# Patient Record
Sex: MALE | Race: WHITE | NOT HISPANIC OR LATINO | Employment: OTHER | URBAN - METROPOLITAN AREA
[De-identification: names, ages, dates, MRNs, and addresses within clinical notes are randomized per-mention and may not be internally consistent; named-entity substitution may affect disease eponyms.]

---

## 2017-02-13 DIAGNOSIS — L40.50 PSORIATIC ARTHRITIS (H): ICD-10-CM

## 2017-02-13 NOTE — TELEPHONE ENCOUNTER
Was to return in 6 weeks to follow up starting MTX.  Left message on answering machine for patient to call back.  Needs to schedule the follow up appt.    Usha Nunez RN  [facility]

## 2017-02-17 NOTE — TELEPHONE ENCOUNTER
Patient made appt 2/28.    Please provide 1 refill to cover until appt.    Usha Nunez RN  [facility]

## 2017-02-28 ENCOUNTER — OFFICE VISIT (OUTPATIENT)
Dept: RHEUMATOLOGY | Facility: CLINIC | Age: 37
End: 2017-02-28
Payer: COMMERCIAL

## 2017-02-28 VITALS
BODY MASS INDEX: 34.56 KG/M2 | SYSTOLIC BLOOD PRESSURE: 143 MMHG | WEIGHT: 234 LBS | HEART RATE: 84 BPM | DIASTOLIC BLOOD PRESSURE: 78 MMHG

## 2017-02-28 DIAGNOSIS — L40.50 PSORIATIC ARTHRITIS (H): ICD-10-CM

## 2017-02-28 PROCEDURE — 99213 OFFICE O/P EST LOW 20 MIN: CPT | Performed by: INTERNAL MEDICINE

## 2017-02-28 NOTE — PROGRESS NOTES
Leon Lind is seen back in followup for his presumed psoriatic arthritis.  He is now on methotrexate and thinks he is doing reasonably well.  He denies having any side effects from the methotrexate, nausea, vomiting, diarrhea, stomatitis.  He is not on prednisone.  He says he definitely feels better and the methotrexate, but is still stiff and still having trouble throwing a ball with his left arm because of the stiffness and discomfort in the shoulders and basically the hips.  He also does not think there has been much change in the rash that is probably psoriasis.  He felt great on the prednisone, just feels better on the methotrexate.      PHYSICAL EXAMINATION:  Blood pressure 143/78, pulse 84.  Joint exam, he definitely does appear stiff and slow reaching for things above his head and getting out of a chair.  There is no obvious synovitis at the MCPs or PIPs.  There is somewhat nonspecific rash, could be psoriasis, near his elbows.      IMPRESSION:  Psoriatic arthritis.      RECOMMENDATIONS:   1.  Increase methotrexate to 20 mg weekly.   2.  Start folic acid.   3.  Check methotrexate labs.   4.  Defer prednisone usage for now, but he will let me know if he changes his mind.   5.  Follow up in approximately 6 weeks.         EMELIA AMADOR MD             D: 2017 12:31   T: 2017 12:52   MT: KAYLEIGH#[id number]      Name:     LEON LIND   MRN:      [id number]        Account:      [id number]   :      1980           Visit Date:   2017      Document: [id number]

## 2017-02-28 NOTE — NURSING NOTE
"Chief Complaint   Patient presents with     RECHECK   states feeling the same, barely using left arm.  prednisone took the pain away.        Initial /78  Pulse 84  Wt 106.1 kg (234 lb)  BMI 34.56 kg/m2 Estimated body mass index is 34.56 kg/(m^2) as calculated from the following:    Height as of 9/30/16: 1.753 m (5' 9\").    Weight as of this encounter: 106.1 kg (234 lb).      Patient prefers to be contacted via at Home.   Okay to leave detailed message: Yes  Patient uses MyChart: No    Samara WATT LPN    "

## 2017-02-28 NOTE — MR AVS SNAPSHOT
"              After Visit Summary   2/28/2017    Leon Samuels    MRN: [id number]           Patient Information     Date Of Birth          1980        Visit Information        Provider Department      2/28/2017 7:30 AM Colt Moncada MD [facility]        Today's Diagnoses     Psoriatic arthritis (H)           Follow-ups after your visit        Follow-up notes from your care team     Return in about 6 weeks (around 4/11/2017).      Future tests that were ordered for you today     Open Future Orders        Priority Expected Expires Ordered    CBC with platelets differential Routine  2/28/2018 2/28/2017    Comprehensive metabolic panel Routine  2/28/2018 2/28/2017            Who to contact     If you have questions or need follow up information about today's clinic visit or your schedule please contact [facility] directly at [phone].  Normal or non-critical lab and imaging results will be communicated to you by MyChart, letter or phone within 4 business days after the clinic has received the results. If you do not hear from us within 7 days, please contact the clinic through [email or web address] or phone. If you have a critical or abnormal lab result, we will notify you by phone as soon as possible.  Submit refill requests through [employer] or call your pharmacy and they will forward the refill request to us. Please allow 3 business days for your refill to be completed.          Additional Information About Your Visit        MyChart Information     Flock lets you send messages to your doctor, view your test results, renew your prescriptions, schedule appointments and more. To sign up, go to [email or web address] . Click on \"Log in\" on the left side of the screen, which will take you to the Welcome page. Then click on \"Sign up Now\" on the right side of the page.     You will be asked to enter the access code listed below, as well as some personal information. " Please follow the directions to create your username and password.     Your access code is: [email or web address]  Expires: 2017  8:27 AM     Your access code will  in 90 days. If you need help or a new code, please call your [place] clinic or [phone].        Care EveryWhere ID     This is your Care EveryWhere ID. This could be used by other organizations to access your [place] medical records  [id number]        Your Vitals Were     Pulse BMI (Body Mass Index)                84 34.56 kg/m2           Blood Pressure from Last 3 Encounters:   17 143/78   16 123/78   16 130/80    Weight from Last 3 Encounters:   17 106.1 kg (234 lb)   16 101.2 kg (223 lb)   16 104.8 kg (231 lb)                 Today's Medication Changes          These changes are accurate as of: 17  8:27 AM.  If you have any questions, ask your nurse or doctor.               These medicines have changed or have updated prescriptions.        Dose/Directions    methotrexate 2.5 MG tablet CHEMO   This may have changed:  how much to take   Used for:  Psoriatic arthritis (H)   Changed by:  Colt Moncada MD        Dose:  20 mg   Take 8 tablets (20 mg) by mouth once a week   Quantity:  40 tablet   Refills:  1            Where to get your medicines      These medications were sent to [place] [address] [place] [address] [address]    Hours:  Tech issues with their phone system Phone:  [phone]     methotrexate 2.5 MG tablet CHEMO                Primary Care Provider Office Phone # Fax #    Lilliana Bailey [phone]       [facility] [address] [place] [address]        Thank you!     Thank you for choosing [facility]  for your care. Our goal is always to provide you with excellent care. Hearing back from our patients is one way we can continue to improve our services. Please take  a few minutes to complete the written survey that you may receive in the mail after your visit with us. Thank you!             Your Updated Medication List - Protect others around you: Learn how to safely use, store and throw away your medicines at www.disposemymeds.org.          This list is accurate as of: 2/28/17  8:27 AM.  Always use your most recent med list.                   Brand Name Dispense Instructions for use    albuterol 108 (90 BASE) MCG/ACT Inhaler    PROAIR HFA/PROVENTIL HFA/VENTOLIN HFA     Inhale 2 puffs into the lungs every 6 hours       folic acid 1 MG tablet    FOLVITE    100 tablet    Take 1 tablet (1 mg) by mouth daily       methotrexate 2.5 MG tablet CHEMO     40 tablet    Take 8 tablets (20 mg) by mouth once a week       predniSONE 5 MG tablet    DELTASONE    70 tablet    20 mg for 1 week--stop if not better; otherwise, taper by 5 mg weekly       QVAR IN      Inhale 2 puffs into the lungs 2 times daily

## 2017-04-07 ENCOUNTER — APPOINTMENT (OUTPATIENT)
Dept: GENERAL RADIOLOGY | Facility: CLINIC | Age: 37
End: 2017-04-07
Attending: EMERGENCY MEDICINE
Payer: COMMERCIAL

## 2017-04-07 ENCOUNTER — HOSPITAL ENCOUNTER (EMERGENCY)
Facility: CLINIC | Age: 37
Discharge: HOME | End: 2017-04-07
Attending: EMERGENCY MEDICINE | Admitting: EMERGENCY MEDICINE
Payer: COMMERCIAL

## 2017-04-07 VITALS
WEIGHT: 230 LBS | RESPIRATION RATE: 16 BRPM | OXYGEN SATURATION: 96 % | TEMPERATURE: 101.3 F | HEART RATE: 99 BPM | SYSTOLIC BLOOD PRESSURE: 120 MMHG | BODY MASS INDEX: 33.97 KG/M2 | DIASTOLIC BLOOD PRESSURE: 48 MMHG

## 2017-04-07 DIAGNOSIS — J45.901 ASTHMA EXACERBATION: ICD-10-CM

## 2017-04-07 DIAGNOSIS — L40.50 PSORIATIC ARTHRITIS (H): ICD-10-CM

## 2017-04-07 DIAGNOSIS — J10.1 INFLUENZA B: ICD-10-CM

## 2017-04-07 LAB
ALBUMIN SERPL-MCNC: 4.1 G/DL (ref 3.4–5)
ALP SERPL-CCNC: 96 U/L (ref 40–150)
ALT SERPL W P-5'-P-CCNC: 69 U/L (ref 0–70)
ANION GAP SERPL CALCULATED.3IONS-SCNC: 9 MMOL/L (ref 3–14)
AST SERPL W P-5'-P-CCNC: 38 U/L (ref 0–45)
BASOPHILS # BLD AUTO: 0 10E9/L (ref 0–0.2)
BASOPHILS NFR BLD AUTO: 0.4 %
BILIRUB DIRECT SERPL-MCNC: 0.2 MG/DL (ref 0–0.2)
BILIRUB SERPL-MCNC: 1.1 MG/DL (ref 0.2–1.3)
BUN SERPL-MCNC: 12 MG/DL (ref 7–30)
CALCIUM SERPL-MCNC: 9 MG/DL (ref 8.5–10.1)
CHLORIDE SERPL-SCNC: 102 MMOL/L (ref 94–109)
CO2 BLDCOV-SCNC: 22 MMOL/L (ref 21–28)
CO2 SERPL-SCNC: 25 MMOL/L (ref 20–32)
CREAT SERPL-MCNC: 1.3 MG/DL (ref 0.66–1.25)
DIFFERENTIAL METHOD BLD: NORMAL
EOSINOPHIL # BLD AUTO: 0 10E9/L (ref 0–0.7)
EOSINOPHIL NFR BLD AUTO: 0.1 %
ERYTHROCYTE [DISTWIDTH] IN BLOOD BY AUTOMATED COUNT: 13 % (ref 10–15)
FLUAV+FLUBV AG SPEC QL: ABNORMAL
FLUAV+FLUBV AG SPEC QL: NEGATIVE
GFR SERPL CREATININE-BSD FRML MDRD: 62 ML/MIN/1.7M2
GLUCOSE SERPL-MCNC: 112 MG/DL (ref 70–99)
HCT VFR BLD AUTO: 45.8 % (ref 40–53)
HGB BLD-MCNC: 16.1 G/DL (ref 13.3–17.7)
IMM GRANULOCYTES # BLD: 0 10E9/L (ref 0–0.4)
IMM GRANULOCYTES NFR BLD: 0.3 %
LACTATE BLD-SCNC: 1.4 MMOL/L (ref 0.7–2.1)
LYMPHOCYTES # BLD AUTO: 0.8 10E9/L (ref 0.8–5.3)
LYMPHOCYTES NFR BLD AUTO: 10.6 %
MCH RBC QN AUTO: 31.7 PG (ref 26.5–33)
MCHC RBC AUTO-ENTMCNC: 35.2 G/DL (ref 31.5–36.5)
MCV RBC AUTO: 90 FL (ref 78–100)
MONOCYTES # BLD AUTO: 0.3 10E9/L (ref 0–1.3)
MONOCYTES NFR BLD AUTO: 4.6 %
NEUTROPHILS # BLD AUTO: 6.2 10E9/L (ref 1.6–8.3)
NEUTROPHILS NFR BLD AUTO: 84 %
NRBC # BLD AUTO: 0 10*3/UL
NRBC BLD AUTO-RTO: 0 /100
PCO2 BLDV: 33 MM HG (ref 40–50)
PH BLDV: 7.43 PH (ref 7.32–7.43)
PLATELET # BLD AUTO: 218 10E9/L (ref 150–450)
PO2 BLDV: 27 MM HG (ref 25–47)
POTASSIUM SERPL-SCNC: 3.9 MMOL/L (ref 3.4–5.3)
PROT SERPL-MCNC: 7.9 G/DL (ref 6.8–8.8)
RBC # BLD AUTO: 5.08 10E12/L (ref 4.4–5.9)
SAO2 % BLDV FROM PO2: 54 %
SODIUM SERPL-SCNC: 136 MMOL/L (ref 133–144)
SPECIMEN SOURCE: ABNORMAL
WBC # BLD AUTO: 7.4 10E9/L (ref 4–11)

## 2017-04-07 PROCEDURE — 96374 THER/PROPH/DIAG INJ IV PUSH: CPT

## 2017-04-07 PROCEDURE — 71020 XR CHEST 2 VW: CPT

## 2017-04-07 PROCEDURE — 93005 ELECTROCARDIOGRAM TRACING: CPT | Mod: 76

## 2017-04-07 PROCEDURE — 25000132 ZZH RX MED GY IP 250 OP 250 PS 637: Performed by: EMERGENCY MEDICINE

## 2017-04-07 PROCEDURE — 85025 COMPLETE CBC W/AUTO DIFF WBC: CPT | Performed by: EMERGENCY MEDICINE

## 2017-04-07 PROCEDURE — 99284 EMERGENCY DEPT VISIT MOD MDM: CPT | Mod: 25

## 2017-04-07 PROCEDURE — 87804 INFLUENZA ASSAY W/OPTIC: CPT | Mod: 91 | Performed by: EMERGENCY MEDICINE

## 2017-04-07 PROCEDURE — 40000275 ZZH STATISTIC RCP TIME EA 10 MIN

## 2017-04-07 PROCEDURE — 80076 HEPATIC FUNCTION PANEL: CPT | Performed by: EMERGENCY MEDICINE

## 2017-04-07 PROCEDURE — 25000125 ZZHC RX 250

## 2017-04-07 PROCEDURE — 94640 AIRWAY INHALATION TREATMENT: CPT | Mod: 76

## 2017-04-07 PROCEDURE — 83605 ASSAY OF LACTIC ACID: CPT

## 2017-04-07 PROCEDURE — 25000125 ZZHC RX 250: Performed by: EMERGENCY MEDICINE

## 2017-04-07 PROCEDURE — 96361 HYDRATE IV INFUSION ADD-ON: CPT

## 2017-04-07 PROCEDURE — 25000128 H RX IP 250 OP 636: Performed by: EMERGENCY MEDICINE

## 2017-04-07 PROCEDURE — 25800025 ZZH RX 258: Performed by: EMERGENCY MEDICINE

## 2017-04-07 PROCEDURE — 94640 AIRWAY INHALATION TREATMENT: CPT

## 2017-04-07 PROCEDURE — 82803 BLOOD GASES ANY COMBINATION: CPT

## 2017-04-07 PROCEDURE — 80048 BASIC METABOLIC PNL TOTAL CA: CPT | Performed by: EMERGENCY MEDICINE

## 2017-04-07 RX ORDER — IPRATROPIUM BROMIDE AND ALBUTEROL SULFATE 2.5; .5 MG/3ML; MG/3ML
6 SOLUTION RESPIRATORY (INHALATION) ONCE
Status: COMPLETED | OUTPATIENT
Start: 2017-04-07 | End: 2017-04-07

## 2017-04-07 RX ORDER — ONDANSETRON 4 MG/1
4 TABLET, ORALLY DISINTEGRATING ORAL EVERY 8 HOURS PRN
Qty: 10 TABLET | Refills: 0 | Status: SHIPPED | OUTPATIENT
Start: 2017-04-07 | End: 2017-04-10

## 2017-04-07 RX ORDER — IPRATROPIUM BROMIDE AND ALBUTEROL SULFATE 2.5; .5 MG/3ML; MG/3ML
3 SOLUTION RESPIRATORY (INHALATION) ONCE
Status: COMPLETED | OUTPATIENT
Start: 2017-04-07 | End: 2017-04-07

## 2017-04-07 RX ORDER — PREDNISONE 20 MG/1
40 TABLET ORAL DAILY
Qty: 8 TABLET | Refills: 0 | Status: SHIPPED | OUTPATIENT
Start: 2017-04-07 | End: 2017-04-11

## 2017-04-07 RX ORDER — IBUPROFEN 600 MG/1
600 TABLET, FILM COATED ORAL ONCE
Status: COMPLETED | OUTPATIENT
Start: 2017-04-07 | End: 2017-04-07

## 2017-04-07 RX ORDER — OSELTAMIVIR PHOSPHATE 75 MG/1
75 CAPSULE ORAL ONCE
Status: COMPLETED | OUTPATIENT
Start: 2017-04-07 | End: 2017-04-07

## 2017-04-07 RX ORDER — BENZONATATE 200 MG/1
200 CAPSULE ORAL 3 TIMES DAILY PRN
Qty: 21 CAPSULE | Refills: 0 | Status: SHIPPED | OUTPATIENT
Start: 2017-04-07 | End: 2017-11-21

## 2017-04-07 RX ORDER — ONDANSETRON 2 MG/ML
4 INJECTION INTRAMUSCULAR; INTRAVENOUS ONCE
Status: COMPLETED | OUTPATIENT
Start: 2017-04-07 | End: 2017-04-07

## 2017-04-07 RX ORDER — IPRATROPIUM BROMIDE AND ALBUTEROL SULFATE 2.5; .5 MG/3ML; MG/3ML
SOLUTION RESPIRATORY (INHALATION)
Status: COMPLETED
Start: 2017-04-07 | End: 2017-04-07

## 2017-04-07 RX ORDER — PREDNISONE 20 MG/1
40 TABLET ORAL ONCE
Status: COMPLETED | OUTPATIENT
Start: 2017-04-07 | End: 2017-04-07

## 2017-04-07 RX ORDER — CODEINE PHOSPHATE AND GUAIFENESIN 10; 100 MG/5ML; MG/5ML
1 SOLUTION ORAL EVERY 4 HOURS PRN
Qty: 120 ML | Refills: 0 | Status: SHIPPED | OUTPATIENT
Start: 2017-04-07 | End: 2017-11-21

## 2017-04-07 RX ORDER — ACETAMINOPHEN 325 MG/1
650 TABLET ORAL ONCE
Status: COMPLETED | OUTPATIENT
Start: 2017-04-07 | End: 2017-04-07

## 2017-04-07 RX ORDER — OSELTAMIVIR PHOSPHATE 75 MG/1
75 CAPSULE ORAL 2 TIMES DAILY
Qty: 9 CAPSULE | Refills: 0 | Status: SHIPPED | OUTPATIENT
Start: 2017-04-07 | End: 2017-04-12

## 2017-04-07 RX ADMIN — ACETAMINOPHEN 650 MG: 325 TABLET, FILM COATED ORAL at 12:20

## 2017-04-07 RX ADMIN — IBUPROFEN 600 MG: 600 TABLET ORAL at 12:17

## 2017-04-07 RX ADMIN — IPRATROPIUM BROMIDE AND ALBUTEROL SULFATE 6 ML: 2.5; .5 SOLUTION RESPIRATORY (INHALATION) at 12:48

## 2017-04-07 RX ADMIN — OSELTAMIVIR PHOSPHATE 75 MG: 75 CAPSULE ORAL at 13:25

## 2017-04-07 RX ADMIN — ONDANSETRON 4 MG: 2 INJECTION INTRAMUSCULAR; INTRAVENOUS at 12:15

## 2017-04-07 RX ADMIN — IPRATROPIUM BROMIDE AND ALBUTEROL SULFATE 3 ML: .5; 3 SOLUTION RESPIRATORY (INHALATION) at 13:59

## 2017-04-07 RX ADMIN — SODIUM CHLORIDE, POTASSIUM CHLORIDE, SODIUM LACTATE AND CALCIUM CHLORIDE 1000 ML: 600; 310; 30; 20 INJECTION, SOLUTION INTRAVENOUS at 12:15

## 2017-04-07 RX ADMIN — IPRATROPIUM BROMIDE AND ALBUTEROL SULFATE 6 ML: .5; 3 SOLUTION RESPIRATORY (INHALATION) at 12:48

## 2017-04-07 RX ADMIN — PREDNISONE 40 MG: 20 TABLET ORAL at 13:23

## 2017-04-07 RX ADMIN — SODIUM CHLORIDE 1000 ML: 9 INJECTION, SOLUTION INTRAVENOUS at 13:40

## 2017-04-07 ASSESSMENT — ENCOUNTER SYMPTOMS
DIARRHEA: 0
ARTHRALGIAS: 1
FEVER: 1
NAUSEA: 1
COUGH: 1
VOMITING: 0
LIGHT-HEADEDNESS: 1
SHORTNESS OF BREATH: 1
CHILLS: 1

## 2017-04-07 NOTE — ED AVS SNAPSHOT
[facility]    [address]    [facility] [address]    Phone:  [phone]    Fax:  [phone]                                       Leon Samuels   MRN: [id number]    Department:  [facility]   Date of Visit:  4/7/2017           After Visit Summary Signature Page     I have received my discharge instructions, and my questions have been answered. I have discussed any challenges I see with this plan with the nurse or doctor.    ..........................................................................................................................................  Patient/Patient Representative Signature      ..........................................................................................................................................  Patient Representative Print Name and Relationship to Patient    ..................................................               ................................................  Date                                            Time    ..........................................................................................................................................  Reviewed by Signature/Title    ...................................................              ..............................................  Date                                                            Time

## 2017-04-07 NOTE — ED AVS SNAPSHOT
[facility] Emergency Department    [address]    [facility] [address]    Phone:  [phone]    Fax:  [phone]                                       Leon Samuels   MRN: [id number]    Department:  [facility] Department   Date of Visit:  4/7/2017           Patient Information     Date Of Birth          1980        Your diagnoses for this visit were:     Influenza B     Asthma exacerbation     Psoriatic arthritis (H)        You were seen by Micky Álvarez MD.        Discharge Instructions       Please make an appointment to follow up with your primary care provider in 3-5 days if not improving.    Use your albuterol inhaler/nebulizer every 4 hours for next 24 hrs and then every 4 hrs as needed after that.    Continue your steroids for 4 days        Discharge Instructions  Influenza    You were diagnosed today with influenza or influenza like illness.  Influenza is a respiratory illness caused by influenza A or B viruses.  Influenza causes fever, headache, muscle aches, and fatigue.  These symptoms start one to four days after you have been around a person with this illness.  People with influenza commonly have a dry cough, sore throat, and a runny nose.   Influenza is spread through sneezing and coughing and can live on surfaces for several days.  It is usually contagious for 5 days but in some cases up to 10 days and often affects several family members. If you have a family member who is less than 2 years old, older than 65 years old, pregnant or has a serious medical condition, they should be seen right away by a physician to decide if they should take preventative medications.      Return to the Emergency Department if:    You have trouble breathing.    You develop pain in your chest.    You have signs of being dehydrated, such as dizziness or unable to urinate at least three times daily.    You feel confused.    You cannot stop vomiting or  you cannot drink enough fluids.    In children, you should seek help if the child has any of the above or if child:    Has blue or purplish skin color.    Is so irritable that he or she does not want to be held.    Does not have tears when crying (in infants) or does not urinate at least three times daily.    Does not wake up easily.    Follow-up with your doctor if you are not improving after 5 days.    What can I do to help myself?    Rest.    Fluids -- Drink hydrating solutions such as Gatorade  or Pedialyte  as often as you can. If you are drinking enough, you should pass urine at least every eight hours.    Tylenol  (acetaminophen) and Advil  (ibuprofen) can relieve fever, headache, and muscle aches. Do not give aspirin to children under 18 years old.     Antiviral treatment -- Antiviral medicines do not make the flu symptoms go away immediately.  They have only been shown to make the symptoms go away 12 to 24 hours sooner than they would without treatment.       Antibiotics -- Antibiotics are NOT useful for treating viral illnesses such as influenza. Antibiotics should only be used if there is a bacterial complication of the flu such as bacterial pneumonia, ear infection, or sinusitis.    Because you were diagnosed with a flu like illness you are very contagious.  This means you cannot work, attend school or  for at least 24 hours or until you no longer have a fever.  If you were given a prescription for medicine here today, be sure to read all of the information (including the package insert) that comes with your prescription.  This will include important information about the medicine, its side effects, and any warnings that you need to know about.  The pharmacist who fills the prescription can provide more information and answer questions you may have about the medicine.  If you have questions or concerns that the pharmacist cannot address, please call or return to the Emergency Department.   Opioid  Medication Information    Pain medications are among the most commonly prescribed medicines, so we are including this information for all our patients. If you did not receive pain medication or get a prescription for pain medicine, you can ignore it.     You may have been given a prescription for an opioid (narcotic) pain medicine and/or have received a pain medicine while here in the Emergency Department. These medicines can make you drowsy or impaired. You must not drive, operate dangerous equipment, or engage in any other dangerous activities while taking these medications. If you drive while taking these medications, you could be arrested for DUI, or driving under the influence. Do not drink any alcohol while you are taking these medications.     Opioid pain medications can cause addiction. If you have a history of chemical dependency of any type, you are at a higher risk of becoming addicted to pain medications.  Only take these prescribed medications to treat your pain when all other options have been tried. Take it for as short a time and as few doses as possible. Store your pain pills in a secure place, as they are frequently stolen and provide a dangerous opportunity for children or visitors in your house to start abusing these powerful medications. We will not replace any lost or stolen medicine.  As soon as your pain is better, you should flush all your remaining medication.     Many prescription pain medications contain Tylenol  (acetaminophen), including Vicodin , Tylenol #3 , Norco , Lortab , and Percocet .  You should not take any extra pills of Tylenol  if you are using these prescription medications or you can get very sick.  Do not ever take more than 3000 mg of acetaminophen in any 24 hour period.    All opioids tend to cause constipation. Drink plenty of water and eat foods that have a lot of fiber, such as fruits, vegetables, prune juice, apple juice and high fiber cereal.  Take a laxative if  you don t move your bowels at least every other day. Miralax , Milk of Magnesia, Colace , or Senna  can be used to keep you regular.      Remember that you can always come back to the Emergency Department if you are not able to see your regular doctor in the amount of time listed above, if you get any new symptoms, or if there is anything that worries you.      Discharge Instructions  Asthma    Asthma is a condition causing narrowing and inflammation of the airways that can make it hard to breathe.  Asthma can also cause cough, wheezing, noisy breathing and tightness in the chest.  Asthma can be brought on or  triggered  by many things, including dust, mold, pollen, cigarette smoke, exercise, stress and infections, like the common cold.     Return to the Emergency Department if:    Your breathing gets worse.    You need to use your inhaler more often than every 4 hours, or can t get relief from your inhaler.    You are very weak, or feel much more ill.    You develop new symptoms, such as chest pain.    You cough up blood.    You are vomiting enough that you can t keep fluids or your medicine down.    What can I do to help myself?    Fill any prescriptions the doctor gave you and take them right away--especially antibiotics. Be sure to finish the whole antibiotic prescription.    You may be given a prescription for an inhaler, which can help loosen tight air passages.  Use this as needed, but not more often than directed. Inhalers work much better when used with a spacer.     You may be given a prescription for a steroid to reduce inflammation. Used long-term, these can have many serious side effects, but for short courses these do not happen. You may notice restlessness or increased appetite.        You may use non-prescription cough or cold medicines. Cough medicines may help, but don t make the cough go away completely.     Avoid smoke, because this can make your symptoms worse. If you smoke, this may be a good  time to quit! Consider using nicotine lozenges, gum, or patches to reduce cravings.     If you have a fever, Tylenol  (acetaminophen), Motrin  (ibuprofen), or Advil  (ibuprofen), may help bring fever down and may help you feel more comfortable. Be sure to read and follow the package directions, and ask your doctor if you have questions.    Be sure to get your flu shot each year.  The pneumonia shot can help prevent pneumonia.  It is important that you follow up with your regular doctor, to be sure that you are improving from this spell (an acute asthma exacerbation), and also to do what you can to keep from having trouble again. Sometimes you need long-term medicines to keep your asthma under control.   If you were given a prescription for medicine here today, be sure to read all of the information (including the package insert) that comes with your prescription.  This will include important information about the medicine, its side effects, and any warnings that you need to know about.  The pharmacist who fills the prescription can provide more information and answer questions you may have about the medicine.  If you have questions or concerns that the pharmacist cannot address, please call or return to the Emergency Department.   Opioid Medication Information    Pain medications are among the most commonly prescribed medicines, so we are including this information for all our patients. If you did not receive pain medication or get a prescription for pain medicine, you can ignore it.     You may have been given a prescription for an opioid (narcotic) pain medicine and/or have received a pain medicine while here in the Emergency Department. These medicines can make you drowsy or impaired. You must not drive, operate dangerous equipment, or engage in any other dangerous activities while taking these medications. If you drive while taking these medications, you could be arrested for DUI, or driving under the influence.  Do not drink any alcohol while you are taking these medications.     Opioid pain medications can cause addiction. If you have a history of chemical dependency of any type, you are at a higher risk of becoming addicted to pain medications.  Only take these prescribed medications to treat your pain when all other options have been tried. Take it for as short a time and as few doses as possible. Store your pain pills in a secure place, as they are frequently stolen and provide a dangerous opportunity for children or visitors in your house to start abusing these powerful medications. We will not replace any lost or stolen medicine.  As soon as your pain is better, you should flush all your remaining medication.     Many prescription pain medications contain Tylenol  (acetaminophen), including Vicodin , Tylenol #3 , Norco , Lortab , and Percocet .  You should not take any extra pills of Tylenol  if you are using these prescription medications or you can get very sick.  Do not ever take more than 3000 mg of acetaminophen in any 24 hour period.    All opioids tend to cause constipation. Drink plenty of water and eat foods that have a lot of fiber, such as fruits, vegetables, prune juice, apple juice and high fiber cereal.  Take a laxative if you don t move your bowels at least every other day. Miralax , Milk of Magnesia, Colace , or Senna  can be used to keep you regular.      Remember that you can always come back to the Emergency Department if you are not able to see your regular doctor in the amount of time listed above, if you get any new symptoms, or if there is anything that worries you.          Future Appointments        Provider Department Dept Phone Center    4/11/2017 7:30 AM Colt Moncada MD [facility] [phone] [facility]      24 Hour Appointment Hotline       To make an appointment at any [facility], call [phone] ([phone]). If you don't have a family  doctor or clinic, we will help you find one. [facility] clinics are conveniently located to serve the needs of you and your family.             Review of your medicines      START taking        Dose / Directions Last dose taken    benzonatate 200 MG capsule   Commonly known as:  TESSALON   Dose:  200 mg   Quantity:  21 capsule        Take 1 capsule (200 mg) by mouth 3 times daily as needed for cough   Refills:  0        guaiFENesin-codeine 100-10 MG/5ML Soln solution   Commonly known as:  ROBITUSSIN AC   Dose:  1 tsp.   Quantity:  120 mL        Take 5 mLs by mouth every 4 hours as needed for cough   Refills:  0        ondansetron 4 MG ODT tab   Commonly known as:  ZOFRAN ODT   Dose:  4 mg   Quantity:  10 tablet        Take 1 tablet (4 mg) by mouth every 8 hours as needed   Refills:  0        oseltamivir 75 MG capsule   Commonly known as:  TAMIFLU   Dose:  75 mg   Quantity:  9 capsule        Take 1 capsule (75 mg) by mouth 2 times daily for 5 days   Refills:  0          CONTINUE these medicines which may have CHANGED, or have new prescriptions. If we are uncertain of the size of tablets/capsules you have at home, strength may be listed as something that might have changed.        Dose / Directions Last dose taken    predniSONE 20 MG tablet   Commonly known as:  DELTASONE   Dose:  40 mg   What changed:    - medication strength  - how much to take  - how to take this  - when to take this  - additional instructions   Quantity:  8 tablet        Take 2 tablets (40 mg) by mouth daily for 4 days   Refills:  0          Our records show that you are taking the medicines listed below. If these are incorrect, please call your family doctor or clinic.        Dose / Directions Last dose taken    albuterol 108 (90 BASE) MCG/ACT Inhaler   Commonly known as:  PROAIR HFA/PROVENTIL HFA/VENTOLIN HFA   Dose:  2 puff        Inhale 2 puffs into the lungs every 6 hours   Refills:  0        methotrexate 2.5 MG tablet CHEMO   Dose:  20 mg    Quantity:  40 tablet        Take 8 tablets (20 mg) by mouth once a week   Refills:  1        QVAR IN   Dose:  2 puff        Inhale 2 puffs into the lungs 2 times daily   Refills:  0                Prescriptions were sent or printed at these locations (5 Prescriptions)                   Other Prescriptions                Printed at Department/Unit printer (5 of 5)         oseltamivir (TAMIFLU) 75 MG capsule               ondansetron (ZOFRAN ODT) 4 MG ODT tab               benzonatate (TESSALON) 200 MG capsule               guaiFENesin-codeine (ROBITUSSIN AC) 100-10 MG/5ML SOLN solution               predniSONE (DELTASONE) 20 MG tablet                Procedures and tests performed during your visit     Basic metabolic panel    CBC with platelets differential    Draw and hold blood cultures    Hepatic panel    ISTAT gases lactate obed POCT    Influenza A/B antigen    XR Chest 2 Views      Orders Needing Specimen Collection     None      Pending Results     No orders found from 4/5/2017 to 4/8/2017.            Pending Culture Results     No orders found from 4/5/2017 to 4/8/2017.            Test Results From Your Hospital Stay        4/7/2017 12:15 PM      Component Results     Component Value Ref Range & Units Status    WBC 7.4 4.0 - 11.0 10e9/L Final    RBC Count 5.08 4.4 - 5.9 10e12/L Final    Hemoglobin 16.1 13.3 - 17.7 g/dL Final    Hematocrit 45.8 40.0 - 53.0 % Final    MCV 90 78 - 100 fl Final    MCH 31.7 26.5 - 33.0 pg Final    MCHC 35.2 31.5 - 36.5 g/dL Final    RDW 13.0 10.0 - 15.0 % Final    Platelet Count 218 150 - 450 10e9/L Final    Diff Method Automated Method  Final    % Neutrophils 84.0 % Final    % Lymphocytes 10.6 % Final    % Monocytes 4.6 % Final    % Eosinophils 0.1 % Final    % Basophils 0.4 % Final    % Immature Granulocytes 0.3 % Final    Nucleated RBCs 0 0 /100 Final    Absolute Neutrophil 6.2 1.6 - 8.3 10e9/L Final    Absolute Lymphocytes 0.8 0.8 - 5.3 10e9/L Final    Absolute Monocytes 0.3  0.0 - 1.3 10e9/L Final    Absolute Eosinophils 0.0 0.0 - 0.7 10e9/L Final    Absolute Basophils 0.0 0.0 - 0.2 10e9/L Final    Abs Immature Granulocytes 0.0 0 - 0.4 10e9/L Final    Absolute Nucleated RBC 0.0  Final         4/7/2017 12:31 PM      Component Results     Component Value Ref Range & Units Status    Sodium 136 133 - 144 mmol/L Final    Potassium 3.9 3.4 - 5.3 mmol/L Final    Chloride 102 94 - 109 mmol/L Final    Carbon Dioxide 25 20 - 32 mmol/L Final    Anion Gap 9 3 - 14 mmol/L Final    Glucose 112 (H) 70 - 99 mg/dL Final    Urea Nitrogen 12 7 - 30 mg/dL Final    Creatinine 1.30 (H) 0.66 - 1.25 mg/dL Final    GFR Estimate 62 >60 mL/min/1.7m2 Final    Non  GFR Calc    GFR Estimate If Black 75 >60 mL/min/1.7m2 Final    African American GFR Calc    Calcium 9.0 8.5 - 10.1 mg/dL Final         4/7/2017 12:36 PM      Component Results     Component Value Ref Range & Units Status    Influenza A/B Agn Specimen Nasal  Final    Influenza A Negative NEG Final    Influenza B  NEG Final    Positive   Test results must be correlated with clinical data. If necessary, results   should be confirmed by a molecular assay or viral culture.   (A)         4/7/2017  1:30 PM      Narrative     CHEST TWO VIEWS   4/7/2017 1:19 PM     HISTORY: Chest pain. Shortness of breath.    COMPARISON: None.    FINDINGS: Minimal linear opacity, left base, likely is fibrosis.  Nothing clearly acute. Heart size normal.        Impression     IMPRESSION: Minimal linear opacity, left base, likely fibrosis.    LATRICE COVINGTON MD         4/7/2017 12:31 PM      Component Results     Component Value Ref Range & Units Status    Bilirubin Direct 0.2 0.0 - 0.2 mg/dL Final    Bilirubin Total 1.1 0.2 - 1.3 mg/dL Final    Albumin 4.1 3.4 - 5.0 g/dL Final    Protein Total 7.9 6.8 - 8.8 g/dL Final    Alkaline Phosphatase 96 40 - 150 U/L Final    ALT 69 0 - 70 U/L Final    AST 38 0 - 45 U/L Final               4/7/2017 12:28 PM      Component  Results     Component Value Ref Range & Units Status    Ph Venous 7.43 7.32 - 7.43 pH Final    PCO2 Venous 33 (L) 40 - 50 mm Hg Final    PO2 Venous 27 25 - 47 mm Hg Final    Bicarbonate Venous 22 21 - 28 mmol/L Final    O2 Sat Venous 54 % Final    Lactic Acid 1.4 0.7 - 2.1 mmol/L Final                Clinical Quality Measure: Blood Pressure Screening     Your blood pressure was checked while you were in the emergency department today. The last reading we obtained was  BP: 113/50 . Please read the guidelines below about what these numbers mean and what you should do about them.  If your systolic blood pressure (the top number) is less than 120 and your diastolic blood pressure (the bottom number) is less than 80, then your blood pressure is normal. There is nothing more that you need to do about it.  If your systolic blood pressure (the top number) is 120-139 or your diastolic blood pressure (the bottom number) is 80-89, your blood pressure may be higher than it should be. You should have your blood pressure rechecked within a year by a primary care provider.  If your systolic blood pressure (the top number) is 140 or greater or your diastolic blood pressure (the bottom number) is 90 or greater, you may have high blood pressure. High blood pressure is treatable, but if left untreated over time it can put you at risk for heart attack, stroke, or kidney failure. You should have your blood pressure rechecked by a primary care provider within the next 4 weeks.  If your provider in the emergency department today gave you specific instructions to follow-up with your doctor or provider even sooner than that, you should follow that instruction and not wait for up to 4 weeks for your follow-up visit.        Thank you for choosing [facility]       Thank you for choosing [facility] for your care. Our goal is always to provide you with excellent care. Hearing back from our patients is one way we can continue to improve our services.  "Please take a few minutes to complete the written survey that you may receive in the mail after you visit with us. Thank you!        [employer] Information     [employer] lets you send messages to your doctor, view your test results, renew your prescriptions, schedule appointments and more. To sign up, go to [email or web address] . Click on \"Log in\" on the left side of the screen, which will take you to the Welcome page. Then click on \"Sign up Now\" on the right side of the page.     You will be asked to enter the access code listed below, as well as some personal information. Please follow the directions to create your username and password.     Your access code is: [email or web address]  Expires: 2017  9:27 AM     Your access code will  in 90 days. If you need help or a new code, please call your [facility] clinic or [phone].        Care EveryWhere ID     This is your Care EveryWhere ID. This could be used by other organizations to access your [facility] medical records  [id number]        After Visit Summary       This is your record. Keep this with you and show to your community pharmacist(s) and doctor(s) at your next visit.                  "

## 2017-04-07 NOTE — DISCHARGE INSTRUCTIONS
Please make an appointment to follow up with your primary care provider in 3-5 days if not improving.    Use your albuterol inhaler/nebulizer every 4 hours for next 24 hrs and then every 4 hrs as needed after that.    Continue your steroids for 4 days        Discharge Instructions  Influenza    You were diagnosed today with influenza or influenza like illness.  Influenza is a respiratory illness caused by influenza A or B viruses.  Influenza causes fever, headache, muscle aches, and fatigue.  These symptoms start one to four days after you have been around a person with this illness.  People with influenza commonly have a dry cough, sore throat, and a runny nose.   Influenza is spread through sneezing and coughing and can live on surfaces for several days.  It is usually contagious for 5 days but in some cases up to 10 days and often affects several family members. If you have a family member who is less than 2 years old, older than 65 years old, pregnant or has a serious medical condition, they should be seen right away by a physician to decide if they should take preventative medications.      Return to the Emergency Department if:    You have trouble breathing.    You develop pain in your chest.    You have signs of being dehydrated, such as dizziness or unable to urinate at least three times daily.    You feel confused.    You cannot stop vomiting or you cannot drink enough fluids.    In children, you should seek help if the child has any of the above or if child:    Has blue or purplish skin color.    Is so irritable that he or she does not want to be held.    Does not have tears when crying (in infants) or does not urinate at least three times daily.    Does not wake up easily.    Follow-up with your doctor if you are not improving after 5 days.    What can I do to help myself?    Rest.    Fluids -- Drink hydrating solutions such as Gatorade  or Pedialyte  as often as you can. If you are drinking enough, you  should pass urine at least every eight hours.    Tylenol  (acetaminophen) and Advil  (ibuprofen) can relieve fever, headache, and muscle aches. Do not give aspirin to children under 18 years old.     Antiviral treatment -- Antiviral medicines do not make the flu symptoms go away immediately.  They have only been shown to make the symptoms go away 12 to 24 hours sooner than they would without treatment.       Antibiotics -- Antibiotics are NOT useful for treating viral illnesses such as influenza. Antibiotics should only be used if there is a bacterial complication of the flu such as bacterial pneumonia, ear infection, or sinusitis.    Because you were diagnosed with a flu like illness you are very contagious.  This means you cannot work, attend school or  for at least 24 hours or until you no longer have a fever.  If you were given a prescription for medicine here today, be sure to read all of the information (including the package insert) that comes with your prescription.  This will include important information about the medicine, its side effects, and any warnings that you need to know about.  The pharmacist who fills the prescription can provide more information and answer questions you may have about the medicine.  If you have questions or concerns that the pharmacist cannot address, please call or return to the Emergency Department.   Opioid Medication Information    Pain medications are among the most commonly prescribed medicines, so we are including this information for all our patients. If you did not receive pain medication or get a prescription for pain medicine, you can ignore it.     You may have been given a prescription for an opioid (narcotic) pain medicine and/or have received a pain medicine while here in the Emergency Department. These medicines can make you drowsy or impaired. You must not drive, operate dangerous equipment, or engage in any other dangerous activities while taking these  medications. If you drive while taking these medications, you could be arrested for DUI, or driving under the influence. Do not drink any alcohol while you are taking these medications.     Opioid pain medications can cause addiction. If you have a history of chemical dependency of any type, you are at a higher risk of becoming addicted to pain medications.  Only take these prescribed medications to treat your pain when all other options have been tried. Take it for as short a time and as few doses as possible. Store your pain pills in a secure place, as they are frequently stolen and provide a dangerous opportunity for children or visitors in your house to start abusing these powerful medications. We will not replace any lost or stolen medicine.  As soon as your pain is better, you should flush all your remaining medication.     Many prescription pain medications contain Tylenol  (acetaminophen), including Vicodin , Tylenol #3 , Norco , Lortab , and Percocet .  You should not take any extra pills of Tylenol  if you are using these prescription medications or you can get very sick.  Do not ever take more than 3000 mg of acetaminophen in any 24 hour period.    All opioids tend to cause constipation. Drink plenty of water and eat foods that have a lot of fiber, such as fruits, vegetables, prune juice, apple juice and high fiber cereal.  Take a laxative if you don t move your bowels at least every other day. Miralax , Milk of Magnesia, Colace , or Senna  can be used to keep you regular.      Remember that you can always come back to the Emergency Department if you are not able to see your regular doctor in the amount of time listed above, if you get any new symptoms, or if there is anything that worries you.      Discharge Instructions  Asthma    Asthma is a condition causing narrowing and inflammation of the airways that can make it hard to breathe.  Asthma can also cause cough, wheezing, noisy breathing and tightness  in the chest.  Asthma can be brought on or  triggered  by many things, including dust, mold, pollen, cigarette smoke, exercise, stress and infections, like the common cold.     Return to the Emergency Department if:    Your breathing gets worse.    You need to use your inhaler more often than every 4 hours, or can t get relief from your inhaler.    You are very weak, or feel much more ill.    You develop new symptoms, such as chest pain.    You cough up blood.    You are vomiting enough that you can t keep fluids or your medicine down.    What can I do to help myself?    Fill any prescriptions the doctor gave you and take them right away--especially antibiotics. Be sure to finish the whole antibiotic prescription.    You may be given a prescription for an inhaler, which can help loosen tight air passages.  Use this as needed, but not more often than directed. Inhalers work much better when used with a spacer.     You may be given a prescription for a steroid to reduce inflammation. Used long-term, these can have many serious side effects, but for short courses these do not happen. You may notice restlessness or increased appetite.        You may use non-prescription cough or cold medicines. Cough medicines may help, but don t make the cough go away completely.     Avoid smoke, because this can make your symptoms worse. If you smoke, this may be a good time to quit! Consider using nicotine lozenges, gum, or patches to reduce cravings.     If you have a fever, Tylenol  (acetaminophen), Motrin  (ibuprofen), or Advil  (ibuprofen), may help bring fever down and may help you feel more comfortable. Be sure to read and follow the package directions, and ask your doctor if you have questions.    Be sure to get your flu shot each year.  The pneumonia shot can help prevent pneumonia.  It is important that you follow up with your regular doctor, to be sure that you are improving from this spell (an acute asthma exacerbation),  and also to do what you can to keep from having trouble again. Sometimes you need long-term medicines to keep your asthma under control.   If you were given a prescription for medicine here today, be sure to read all of the information (including the package insert) that comes with your prescription.  This will include important information about the medicine, its side effects, and any warnings that you need to know about.  The pharmacist who fills the prescription can provide more information and answer questions you may have about the medicine.  If you have questions or concerns that the pharmacist cannot address, please call or return to the Emergency Department.   Opioid Medication Information    Pain medications are among the most commonly prescribed medicines, so we are including this information for all our patients. If you did not receive pain medication or get a prescription for pain medicine, you can ignore it.     You may have been given a prescription for an opioid (narcotic) pain medicine and/or have received a pain medicine while here in the Emergency Department. These medicines can make you drowsy or impaired. You must not drive, operate dangerous equipment, or engage in any other dangerous activities while taking these medications. If you drive while taking these medications, you could be arrested for DUI, or driving under the influence. Do not drink any alcohol while you are taking these medications.     Opioid pain medications can cause addiction. If you have a history of chemical dependency of any type, you are at a higher risk of becoming addicted to pain medications.  Only take these prescribed medications to treat your pain when all other options have been tried. Take it for as short a time and as few doses as possible. Store your pain pills in a secure place, as they are frequently stolen and provide a dangerous opportunity for children or visitors in your house to start abusing these powerful  medications. We will not replace any lost or stolen medicine.  As soon as your pain is better, you should flush all your remaining medication.     Many prescription pain medications contain Tylenol  (acetaminophen), including Vicodin , Tylenol #3 , Norco , Lortab , and Percocet .  You should not take any extra pills of Tylenol  if you are using these prescription medications or you can get very sick.  Do not ever take more than 3000 mg of acetaminophen in any 24 hour period.    All opioids tend to cause constipation. Drink plenty of water and eat foods that have a lot of fiber, such as fruits, vegetables, prune juice, apple juice and high fiber cereal.  Take a laxative if you don t move your bowels at least every other day. Miralax , Milk of Magnesia, Colace , or Senna  can be used to keep you regular.      Remember that you can always come back to the Emergency Department if you are not able to see your regular doctor in the amount of time listed above, if you get any new symptoms, or if there is anything that worries you.

## 2017-04-07 NOTE — ED PROVIDER NOTES
"  History     Chief Complaint:  Fever      The history is provided by the patient and the spouse.      Leon Samuels is an immunosuppressed 36 year old male on methotrexate for psoriatic arthritis who presents with fever.  The patient has had worsening cough since this past Wednesday with fever and chills and body aches developing this morning.  Today after walking around 200 yards he felt he was going to pass out prompting visit to the emergency department.  He has had some relief of cough with inhalers.  He also took some Prednisone he had at home last night and again this morning.  Spouse reports that since his dose of methotrexate was increased a month ago he cannot remember things as well, has been more sore in general, and is more irritable; she states \"his body is shutting down\".  Patient does endorse some nausea.  He has not had any Tylenol/Ibuprofen today.  He denies vomiting, diarrhea, known sick contacts, or other complaint.      Allergies:  No known drug allergies      Medications:    Methotrexate  Folvite  Qvar  Albuterol inhaler    Past Medical History:    Asthma  DDD, lumbar  Lumbosacral radiculopathy  Psoriatic arthritis    Past Surgical History:    History reviewed. No pertinent surgical history.     Family History:    History reviewed. No pertinent family history.      Social History:  Presents with spouse   Patient has 3 children, youngest being 1 year old  Tobacco use: Never smoker; former chewing tobacco user  Alcohol use: Once weekly  PCP: Lilliana Bailey    Rheumatologist: Colt Moncada MD  Marital Status:          Review of Systems   Constitutional: Positive for chills and fever.   Respiratory: Positive for cough and shortness of breath.    Gastrointestinal: Positive for nausea. Negative for diarrhea and vomiting.   Musculoskeletal: Positive for arthralgias.   Neurological: Positive for light-headedness.   All other systems reviewed and are negative.      Physical Exam "     Patient Vitals for the past 24 hrs:   BP Temp Temp src Pulse Resp SpO2 Weight   04/07/17 1415 113/50 - - - - 93 % -   04/07/17 1400 103/52 - - - - 97 % -   04/07/17 1359 - - - - - 95 % -   04/07/17 1345 103/58 - - - - 93 % -   04/07/17 1330 - - - - - 95 % -   04/07/17 1327 122/61 101.3  F (38.5  C) Oral 99 - 100 % -   04/07/17 1255 98/45 - - - - 97 % -   04/07/17 1230 101/83 - - - - 92 % -   04/07/17 1200 - - - - - 95 % -   04/07/17 1122 124/74 102.9  F (39.4  C) Oral 112 20 92 % 104.3 kg (230 lb)      Physical Exam   Constitutional:   Uncomfortable appearing   HENT:   Right Ear: External ear normal.   Left Ear: External ear normal.   Nose: Nose normal.   Mouth/Throat: No oropharyngeal exudate.   Eyes: Conjunctivae and lids are normal.   Neck: Normal range of motion. Neck supple. No tracheal deviation present.   Cardiovascular: Regular rhythm and intact distal pulses.    tachycardia   Pulmonary/Chest:   Mild tachypnea, frequent cough   Abdominal: Soft. There is no tenderness. There is no rebound and no guarding.   Musculoskeletal:   No peripheral edema   Lymphadenopathy:     He has no cervical adenopathy.   Neurological:   MAEE, no gross focal motor or sensory deficit   Skin: Skin is warm and dry. He is not diaphoretic.   Psychiatric: He has a normal mood and affect.   Nursing note and vitals reviewed.        Emergency Department Course   Imaging:  Radiographic findings were communicated with the patient and family who voiced understanding of the findings.    XR Chest:  IMPRESSION: Minimal linear opacity, left base, likely fibrosis.    LATRICE COVINGTON MD    Results per radiology.    Laboratory:  CBC: WNL (WBC 7.4, HGB 16.1, )   BMP: Creatinine 1.30 (H), Glucose 112 (H) ow WNL   Hepatic panel: [facility]  Venous Blood Gas    Lab 04/07/17  1136   PHV 7.43   PCO2V 33*   PO2V 27   HCO3V 22   Lactic 1.4        Influenza A/B antigen: A Negative, B Positive     Interventions:  1215: Zofran 4 mg IV  1215: Lactated  ringers 1L IV Bolus  1217: Ibuprofen 600 mg PO  1220: Tylenol 650 mg PO  1248: Duoneb 6 mL nebulization   1323: Deltasone 40 mg PO  1325: Tamiflu 75 mg PO  1340: NS 1L IV Bolus   1359: Duoneb 3 mL nebulization    Emergency Department Course:  Past medical records, nursing notes, and vitals reviewed.  1132: I performed an exam of the patient and obtained history, as documented above.   Above workup undertaken.  1300: I rechecked the patient. Explained findings to patient.   I personally reviewed the laboratory results with the Patient and spouse and answered all related questions prior to discharge.   1420: I rechecked the patient.  Feels improved.  Findings and plan explained to the Patient. Patient discharged home with instructions regarding supportive care, medications, and reasons to return. The importance of close follow-up was reviewed.      Impression & Plan    Medical Decision Making:  Leon Samuels is a 36 year old male immunosuppressed on methotrexate here with influenza type illness.  Workup here positive for influenza B.  He has an asthma exacerbation as well.  XR shows no evidence of concomitant pneumonia.  Liver function tests show no elevation of his transaminases.  He has mild renal insufficiency likely secondary to dehydration.  Plan will be to do bronchodilators, IV fluid, antipyretics, and assess response to treatment to see if he can be safely transitioned to home.  Given he is immunosuppressed we did start Tamiflu.    Update:  He did well with fluids and other interventions as above.  He feels comfortable being discharged home with plan for follow up through clinic.       Diagnosis:    ICD-10-CM    1. Influenza B J10.1    2. Asthma exacerbation J45.901    3. Psoriatic arthritis (H) L40.50        Disposition:  Discharged to home with plan as outlined.    Discharge Medications:  New Prescriptions    BENZONATATE (TESSALON) 200 MG CAPSULE    Take 1 capsule (200 mg) by mouth 3 times daily as needed  for cough    GUAIFENESIN-CODEINE (ROBITUSSIN AC) 100-10 MG/5ML SOLN SOLUTION    Take 5 mLs by mouth every 4 hours as needed for cough    ONDANSETRON (ZOFRAN ODT) 4 MG ODT TAB    Take 1 tablet (4 mg) by mouth every 8 hours as needed    OSELTAMIVIR (TAMIFLU) 75 MG CAPSULE    Take 1 capsule (75 mg) by mouth 2 times daily for 5 days    PREDNISONE (DELTASONE) 20 MG TABLET    Take 2 tablets (40 mg) by mouth daily for 4 days         Masoud Oleary  4/7/2017   [facility] EMERGENCY DEPARTMENT    IMasoud, tana serving as a scribe at 11:32 AM on 4/7/2017 to document services personally performed by Micky Álvarez MD based on my observations and the provider's statements to me.        Micky Álvarez MD  04/07/17 1524

## 2017-04-07 NOTE — ED NOTES
Patient presents with a complaint of body aches and fever that started this morning. Patient states he last felt well on Tuesday. ABC intact, A&Ox4.

## 2017-04-11 ENCOUNTER — OFFICE VISIT (OUTPATIENT)
Dept: RHEUMATOLOGY | Facility: CLINIC | Age: 37
End: 2017-04-11
Payer: COMMERCIAL

## 2017-04-11 VITALS
HEART RATE: 60 BPM | SYSTOLIC BLOOD PRESSURE: 122 MMHG | TEMPERATURE: 97.6 F | WEIGHT: 233 LBS | BODY MASS INDEX: 34.41 KG/M2 | DIASTOLIC BLOOD PRESSURE: 83 MMHG

## 2017-04-11 DIAGNOSIS — L40.50 PSORIATIC ARTHRITIS (H): Primary | ICD-10-CM

## 2017-04-11 PROCEDURE — 99213 OFFICE O/P EST LOW 20 MIN: CPT | Performed by: INTERNAL MEDICINE

## 2017-04-11 RX ORDER — LEFLUNOMIDE 20 MG/1
20 TABLET ORAL DAILY
Qty: 31 TABLET | Refills: 3 | Status: SHIPPED | OUTPATIENT
Start: 2017-04-11 | End: 2018-04-25

## 2017-04-11 NOTE — NURSING NOTE
"Chief Complaint   Patient presents with     RECHECK     had the flu Friday     States the flu sent him to the hospital.   Initial /83  Pulse 60  Temp 97.6  F (36.4  C)  Wt 105.7 kg (233 lb)  BMI 34.41 kg/m2 Estimated body mass index is 34.41 kg/(m^2) as calculated from the following:    Height as of 9/30/16: 1.753 m (5' 9\").    Weight as of this encounter: 105.7 kg (233 lb).      Patient prefers to be contacted via at Home.   Okay to leave detailed message: Yes  Patient uses MyChart: No    Samara WATT LPN    "

## 2017-04-11 NOTE — MR AVS SNAPSHOT
"              After Visit Summary   2017    Leon Samuels    MRN: [id number]           Patient Information     Date Of Birth          1980        Visit Information        Provider Department      2017 7:30 AM Colt Moncada MD [facility]        Today's Diagnoses     Psoriatic arthritis (H)    -  1       Follow-ups after your visit        Who to contact     If you have questions or need follow up information about today's clinic visit or your schedule please contact [facility] directly at [phone].  Normal or non-critical lab and imaging results will be communicated to you by Mochilahart, letter or phone within 4 business days after the clinic has received the results. If you do not hear from us within 7 days, please contact the clinic through Copytelet or phone. If you have a critical or abnormal lab result, we will notify you by phone as soon as possible.  Submit refill requests through [employer] or call your pharmacy and they will forward the refill request to us. Please allow 3 business days for your refill to be completed.          Additional Information About Your Visit        MyChart Information     [employer] lets you send messages to your doctor, view your test results, renew your prescriptions, schedule appointments and more. To sign up, go to [email or web address] . Click on \"Log in\" on the left side of the screen, which will take you to the Welcome page. Then click on \"Sign up Now\" on the right side of the page.     You will be asked to enter the access code listed below, as well as some personal information. Please follow the directions to create your username and password.     Your access code is: [email or web address]  Expires: 2017  9:27 AM     Your access code will  in 90 days. If you need help or a new code, please call your [facility] clinic or [phone].        Care EveryWhere ID     This is your Care EveryWhere ID. This could be " used by other organizations to access your [facility] medical records  [id number]        Your Vitals Were     Pulse Temperature BMI (Body Mass Index)             60 97.6  F (36.4  C) 34.41 kg/m2          Blood Pressure from Last 3 Encounters:   04/11/17 122/83   04/07/17 120/48   02/28/17 143/78    Weight from Last 3 Encounters:   04/11/17 105.7 kg (233 lb)   04/07/17 104.3 kg (230 lb)   02/28/17 106.1 kg (234 lb)              Today, you had the following     No orders found for display         Today's Medication Changes          These changes are accurate as of: 4/11/17  9:35 AM.  If you have any questions, ask your nurse or doctor.               Start taking these medicines.        Dose/Directions    leflunomide 20 MG tablet   Commonly known as:  ARAVA   Used for:  Psoriatic arthritis (H)   Started by:  Colt Moncada MD        Dose:  20 mg   Take 1 tablet (20 mg) by mouth daily   Quantity:  31 tablet   Refills:  3            Where to get your medicines      These medications were sent to [address] [address]    Hours:  Tech issues with their phone system Phone:  [phone]     leflunomide 20 MG tablet                Primary Care Provider Office Phone # Fax #    Lilliana Bailey [phone]       [facility] [address] [place] [address]        Thank you!     Thank you for choosing [facility]  for your care. Our goal is always to provide you with excellent care. Hearing back from our patients is one way we can continue to improve our services. Please take a few minutes to complete the written survey that you may receive in the mail after your visit with us. Thank you!             Your Updated Medication List - Protect others around you: Learn how to safely use, store and throw away your medicines at www.disposemymeds.org.          This list is accurate as of:  4/11/17  9:35 AM.  Always use your most recent med list.                   Brand Name Dispense Instructions for use    albuterol 108 (90 BASE) MCG/ACT Inhaler    PROAIR HFA/PROVENTIL HFA/VENTOLIN HFA     Inhale 2 puffs into the lungs every 6 hours       benzonatate 200 MG capsule    TESSALON    21 capsule    Take 1 capsule (200 mg) by mouth 3 times daily as needed for cough       guaiFENesin-codeine 100-10 MG/5ML Soln solution    ROBITUSSIN AC    120 mL    Take 5 mLs by mouth every 4 hours as needed for cough       leflunomide 20 MG tablet    ARAVA    31 tablet    Take 1 tablet (20 mg) by mouth daily       methotrexate 2.5 MG tablet CHEMO     40 tablet    Take 8 tablets (20 mg) by mouth once a week       oseltamivir 75 MG capsule    TAMIFLU    9 capsule    Take 1 capsule (75 mg) by mouth 2 times daily for 5 days       predniSONE 20 MG tablet    DELTASONE    8 tablet    Take 2 tablets (40 mg) by mouth daily for 4 days       QVAR IN      Inhale 2 puffs into the lungs 2 times daily

## 2017-04-11 NOTE — PROGRESS NOTES
RHEUMATOLOGY CONSULTATION      HISTORY OF PRESENT ILLNESS:  Mr. Leon Lind is seen back in followup for psoriatic arthritis.  There has not really been any change in his psoriasis.  He stopped the methotrexate, though he was diagnosed with influenza B was admitted for a short period of time last week.  He thinks that the higher dose of methotrexate caused him to have some generalized tiredness, fogginess and did not want to continue it.  He is wondering about other treatment options.      PHYSICAL EXAMINATION:   VITAL SIGNS:  Blood pressure 122/83, pulse 60.   HEENT:  Psoriasis present on the scalp.     EXTREMITIES:  No psoriatic arthritis, no synovitis noted of the wrists, MCPs, or PIPs.   SKIN:  Without lesions.      IMPRESSION:   1.  Psoriatic arthritis.   2.  Methotrexate side effects.      RECOMMENDATIONS:   1.  Discontinue methotrexate, folic acid.   2.  Risks, benefits, side effects of leflunomide therapy discussed including but not limited to nausea, vomiting, diarrhea, hepatic dysfunction, leukopenia, infections.  Discussed will start 20 mg daily with onset of action 4-8 weeks with peak effect at 3-4 months.   3.  Followup with me in approximately 6 weeks, check labs at that time.         EMELIA AMADOR MD             D: 2017 12:39   T: 2017 13:16   MT: KAYLEIGH#[id number]      Name:     LEON LIND   MRN:      [id number]        Account:      [id number]   :      1980           Visit Date:   2017      Document: [id number]

## 2017-11-21 ENCOUNTER — OFFICE VISIT (OUTPATIENT)
Dept: RHEUMATOLOGY | Facility: CLINIC | Age: 37
End: 2017-11-21
Payer: COMMERCIAL

## 2017-11-21 VITALS
SYSTOLIC BLOOD PRESSURE: 131 MMHG | OXYGEN SATURATION: 94 % | DIASTOLIC BLOOD PRESSURE: 88 MMHG | HEART RATE: 75 BPM | WEIGHT: 235 LBS | BODY MASS INDEX: 34.7 KG/M2

## 2017-11-21 DIAGNOSIS — L40.50 PSORIATIC ARTHRITIS (H): Primary | ICD-10-CM

## 2017-11-21 PROCEDURE — 99213 OFFICE O/P EST LOW 20 MIN: CPT | Performed by: INTERNAL MEDICINE

## 2017-11-21 NOTE — NURSING NOTE
"Chief Complaint   Patient presents with     RECHECK       Initial /88  Pulse 75  Wt 106.6 kg (235 lb)  SpO2 94%  BMI 34.7 kg/m2 Estimated body mass index is 34.7 kg/(m^2) as calculated from the following:    Height as of 9/30/16: 1.753 m (5' 9\").    Weight as of this encounter: 106.6 kg (235 lb).      Patient prefers to be contacted via at Home.   Okay to leave detailed message: Yes  Patient uses MyChart: Alice WARNER LPN    "

## 2017-11-21 NOTE — PROGRESS NOTES
HISTORY:  Leon Lind is seen back in followup for his psoriatic arthritis.  He has not been on anything since this summer.  Shortly after seeing me, he was treated for influenza B, so he discontinued the methotrexate and never started the leflunomide and actually done well until the last few weeks when he started now having his psoriasis return and has started having increasing joint pain with stiffness.  It is bad enough he is ready to consider going back on something, is wondering what we can do while we are waiting for something to kick in.      PHYSICAL EXAMINATION:   VITAL SIGNS:  Blood pressure 131/88, pulse 75.     SKIN:  There is psoriasis on the scalp.   JOINT EXAM:  There is synovitis and tenderness of the bilateral wrists, bilateral 2, 3, 4 MCP joints of trace degree.      IMPRESSION:   1.  Psoriasis.   2.  Psoriatic arthritis.      RECOMMENDATIONS:   1.  Risks, benefits, side effects of Humira therapy discussed including I am limited to site reactions, infections, malignancies.  Will start him on 40 mg subcutaneously every 2 weeks.   2.  Check baseline hepatitis serologies and TB QuantiFERON.   3.  Will need followup in 2-3 months.         EMELIA AMADOR MD             D: 2017 12:20   T: 2017 15:58   MT: EM#[id number]      Name:     LEON LIND   MRN:      [id number]        Account:      [id number]   :      1980           Visit Date:   2017      Document: [id number]

## 2017-11-22 ENCOUNTER — TELEPHONE (OUTPATIENT)
Dept: RHEUMATOLOGY | Facility: CLINIC | Age: 37
End: 2017-11-22

## 2017-11-22 NOTE — TELEPHONE ENCOUNTER
[facility] Prior Authorization Team   Phone: [phone]  Fax: [phone]    PA Initiation    Medication: Humira - PENDING  Insurance Company: [employer] ([facility]) - Phone [phone] Fax [phone]  [place] [facility] [place] [facility] [address] [place] [address]  Filling Pharmacy Phone:    Filling Pharmacy Fax:    Start Date: 11/22/2017

## 2017-11-24 ENCOUNTER — TELEPHONE (OUTPATIENT)
Dept: RHEUMATOLOGY | Facility: CLINIC | Age: 37
End: 2017-11-24

## 2017-11-24 ENCOUNTER — NURSE TRIAGE (OUTPATIENT)
Dept: NURSING | Facility: CLINIC | Age: 37
End: 2017-11-24

## 2017-11-24 DIAGNOSIS — L40.50 PSORIATIC ARTHRITIS (H): Primary | ICD-10-CM

## 2017-11-24 RX ORDER — PREDNISONE 5 MG/1
TABLET ORAL
Qty: 70 TABLET | Refills: 0 | Status: SHIPPED | OUTPATIENT
Start: 2017-11-24 | End: 2018-04-25

## 2017-11-24 NOTE — TELEPHONE ENCOUNTER
Clinic Action Needed:Yes, Please call patient [phone]  Reason for Call:Patient was seen in clinic 11/21/17 stating they did not receive the prescription for Prednisone.  Stating patient was to receive both Humira and Prednisone prescription.  Patient is requesting prescription for Prednisone to be sent to [facility] in [place] today as they are traveling.   Routed to:Rheumatology Nurse Uzair Jacobson RN  [facility]

## 2017-11-24 NOTE — TELEPHONE ENCOUNTER
VORB:  Prednisone 5mg  Take 4 tablets (20mg) by mouth for 1 week, taper by 5 mg weekly thereafter, qty of 70 with 0 refills. Little Raymond  [facility] RN

## 2017-11-24 NOTE — TELEPHONE ENCOUNTER
Spouse calling and reports that Leon was suppose to get a Prednisone prescription while waiting on the Humira through the mail order pharmacy.  She reports he is in a lot of pain and would like to get started on this asap.  Advised that this med was not ordered and will have to contact Dr. Moncada to issue this prescription.    Little Raymond  [facility] RN

## 2017-11-24 NOTE — TELEPHONE ENCOUNTER
Clinic Action Needed:Yes, Please call patient [phone]  Reason for Call:Patient was seen in clinic 11/21/17 stating they did not receive the prescription for Prednisone.  Stating patient was to receive both Humira and Prednisone prescription.  Patient is requesting prescription for Prednisone to be sent to [facility] in [place] today as they are traveling.   Routed to:Rheumatology Nurse Uzair Jacobson RN  [facility]

## 2017-11-24 NOTE — TELEPHONE ENCOUNTER
Called Shahrzad and informed that Dr. Moncada ordered Prednisone and rx sent to pharmacy in [place].    Little Raymond  [facility] RN

## 2017-11-27 NOTE — TELEPHONE ENCOUNTER
Prior Authorization Approval    Authorization Effective Date: 11/22/2017  Authorization Expiration Date: 11/22/2018  Medication: Humira - APPROVED  Approved Dose/Quantity: N/A  Reference #: [id number]   Insurance Company: [employer] ([facility]) - Phone [phone] Fax [phone]  Expected CoPay:       CoPay Card Available:      Foundation Assistance Needed:    Which Pharmacy is filling the prescription (Not needed for infusion/clinic administered): [facility] [place] [facility] [place]   Pharmacy Notified:    Patient Notified:

## 2017-11-30 DIAGNOSIS — L40.50 PSORIATIC ARTHRITIS (H): ICD-10-CM

## 2018-04-12 ENCOUNTER — TELEPHONE (OUTPATIENT)
Dept: RHEUMATOLOGY | Facility: CLINIC | Age: 38
End: 2018-04-12

## 2018-04-12 DIAGNOSIS — L40.50 PSORIATIC ARTHRITIS (H): ICD-10-CM

## 2018-04-12 NOTE — TELEPHONE ENCOUNTER
Patient has an appointment with Dr. Jimenez on 4/25/18. He is looking for a refill of his Humira before that appointment.     Pending refill for now. Need to confirm pharmacy with the patient. Left voicemail for patient to call back the clinic.     When patient calls back, confirm pharmacy then route to Dr. Tirado and/or Dr. Jimenez.

## 2018-04-12 NOTE — TELEPHONE ENCOUNTER
Reason for Call:  Other prescription    Detailed comments: pt is currently on Humara and he running out of the medication. His previous Dr retired and he would like to be seen to at least his medication filled until a longer appt can be made.    Phone Number Patient can be reached at: Home number on file [phone] (home)    Best Time: any    Can we leave a detailed message on this number? YES    Call taken on 4/12/2018 at 1:11 PM by Yaritza Siegel

## 2018-04-25 ENCOUNTER — OFFICE VISIT (OUTPATIENT)
Dept: RHEUMATOLOGY | Facility: CLINIC | Age: 38
End: 2018-04-25
Attending: NURSE PRACTITIONER
Payer: COMMERCIAL

## 2018-04-25 VITALS
HEIGHT: 69 IN | SYSTOLIC BLOOD PRESSURE: 137 MMHG | WEIGHT: 242.9 LBS | HEART RATE: 90 BPM | BODY MASS INDEX: 35.98 KG/M2 | DIASTOLIC BLOOD PRESSURE: 93 MMHG

## 2018-04-25 DIAGNOSIS — L40.50 PSORIATIC ARTHRITIS (H): ICD-10-CM

## 2018-04-25 DIAGNOSIS — D84.9 IMMUNOSUPPRESSION (H): ICD-10-CM

## 2018-04-25 DIAGNOSIS — L40.9 PSORIASIS: ICD-10-CM

## 2018-04-25 DIAGNOSIS — R74.8 ELEVATED LIVER ENZYMES: ICD-10-CM

## 2018-04-25 DIAGNOSIS — R53.83 FATIGUE, UNSPECIFIED TYPE: ICD-10-CM

## 2018-04-25 DIAGNOSIS — L40.50 PSORIATIC ARTHRITIS (H): Primary | ICD-10-CM

## 2018-04-25 DIAGNOSIS — R63.5 WEIGHT GAIN: ICD-10-CM

## 2018-04-25 LAB
ALBUMIN SERPL-MCNC: 4.4 G/DL (ref 3.4–5)
ALBUMIN UR-MCNC: NEGATIVE MG/DL
ALT SERPL W P-5'-P-CCNC: 110 U/L (ref 0–70)
APPEARANCE UR: CLEAR
AST SERPL W P-5'-P-CCNC: 47 U/L (ref 0–45)
BASOPHILS # BLD AUTO: 0 10E9/L (ref 0–0.2)
BASOPHILS NFR BLD AUTO: 0.3 %
BILIRUB UR QL STRIP: NEGATIVE
COLOR UR AUTO: YELLOW
CREAT SERPL-MCNC: 1.2 MG/DL (ref 0.66–1.25)
DIFFERENTIAL METHOD BLD: NORMAL
EOSINOPHIL # BLD AUTO: 0.2 10E9/L (ref 0–0.7)
EOSINOPHIL NFR BLD AUTO: 3 %
ERYTHROCYTE [DISTWIDTH] IN BLOOD BY AUTOMATED COUNT: 12.1 % (ref 10–15)
GFR SERPL CREATININE-BSD FRML MDRD: 68 ML/MIN/1.7M2
GLUCOSE UR STRIP-MCNC: NEGATIVE MG/DL
HBA1C MFR BLD: 5.7 % (ref 0–6.4)
HCT VFR BLD AUTO: 47.5 % (ref 40–53)
HGB BLD-MCNC: 16.9 G/DL (ref 13.3–17.7)
HGB UR QL STRIP: NEGATIVE
IMM GRANULOCYTES # BLD: 0 10E9/L (ref 0–0.4)
IMM GRANULOCYTES NFR BLD: 0.3 %
KETONES UR STRIP-MCNC: NEGATIVE MG/DL
LEUKOCYTE ESTERASE UR QL STRIP: NEGATIVE
LYMPHOCYTES # BLD AUTO: 2.6 10E9/L (ref 0.8–5.3)
LYMPHOCYTES NFR BLD AUTO: 40.4 %
MCH RBC QN AUTO: 31.8 PG (ref 26.5–33)
MCHC RBC AUTO-ENTMCNC: 35.6 G/DL (ref 31.5–36.5)
MCV RBC AUTO: 89 FL (ref 78–100)
MONOCYTES # BLD AUTO: 0.6 10E9/L (ref 0–1.3)
MONOCYTES NFR BLD AUTO: 9.1 %
NEUTROPHILS # BLD AUTO: 3 10E9/L (ref 1.6–8.3)
NEUTROPHILS NFR BLD AUTO: 46.9 %
NITRATE UR QL: NEGATIVE
NRBC # BLD AUTO: 0 10*3/UL
NRBC BLD AUTO-RTO: 0 /100
PH UR STRIP: 6 PH (ref 5–7)
PLATELET # BLD AUTO: 220 10E9/L (ref 150–450)
RBC # BLD AUTO: 5.32 10E12/L (ref 4.4–5.9)
RBC #/AREA URNS AUTO: <1 /HPF (ref 0–2)
SOURCE: NORMAL
SP GR UR STRIP: 1.02 (ref 1–1.03)
TSH SERPL DL<=0.005 MIU/L-ACNC: 1.07 MU/L (ref 0.4–4)
UROBILINOGEN UR STRIP-MCNC: 0 MG/DL (ref 0–2)
WBC # BLD AUTO: 6.4 10E9/L (ref 4–11)
WBC #/AREA URNS AUTO: <1 /HPF (ref 0–5)

## 2018-04-25 PROCEDURE — 85025 COMPLETE CBC W/AUTO DIFF WBC: CPT | Performed by: NURSE PRACTITIONER

## 2018-04-25 PROCEDURE — 86803 HEPATITIS C AB TEST: CPT | Performed by: NURSE PRACTITIONER

## 2018-04-25 PROCEDURE — 81001 URINALYSIS AUTO W/SCOPE: CPT | Performed by: NURSE PRACTITIONER

## 2018-04-25 PROCEDURE — 84443 ASSAY THYROID STIM HORMONE: CPT | Performed by: NURSE PRACTITIONER

## 2018-04-25 PROCEDURE — 84450 TRANSFERASE (AST) (SGOT): CPT | Performed by: NURSE PRACTITIONER

## 2018-04-25 PROCEDURE — 84460 ALANINE AMINO (ALT) (SGPT): CPT | Performed by: NURSE PRACTITIONER

## 2018-04-25 PROCEDURE — 36415 COLL VENOUS BLD VENIPUNCTURE: CPT | Performed by: NURSE PRACTITIONER

## 2018-04-25 PROCEDURE — 86704 HEP B CORE ANTIBODY TOTAL: CPT | Performed by: NURSE PRACTITIONER

## 2018-04-25 PROCEDURE — 87340 HEPATITIS B SURFACE AG IA: CPT | Performed by: NURSE PRACTITIONER

## 2018-04-25 PROCEDURE — 82040 ASSAY OF SERUM ALBUMIN: CPT | Performed by: NURSE PRACTITIONER

## 2018-04-25 PROCEDURE — 83036 HEMOGLOBIN GLYCOSYLATED A1C: CPT | Performed by: NURSE PRACTITIONER

## 2018-04-25 PROCEDURE — 83516 IMMUNOASSAY NONANTIBODY: CPT | Performed by: NURSE PRACTITIONER

## 2018-04-25 PROCEDURE — 86200 CCP ANTIBODY: CPT | Performed by: NURSE PRACTITIONER

## 2018-04-25 PROCEDURE — 82565 ASSAY OF CREATININE: CPT | Performed by: NURSE PRACTITIONER

## 2018-04-25 PROCEDURE — 86480 TB TEST CELL IMMUN MEASURE: CPT | Performed by: NURSE PRACTITIONER

## 2018-04-25 PROCEDURE — G0463 HOSPITAL OUTPT CLINIC VISIT: HCPCS | Mod: ZF

## 2018-04-25 PROCEDURE — 86431 RHEUMATOID FACTOR QUANT: CPT | Performed by: NURSE PRACTITIONER

## 2018-04-25 ASSESSMENT — PAIN SCALES - GENERAL: PAINLEVEL: NO PAIN (0)

## 2018-04-25 NOTE — NURSING NOTE
"Chief Complaint   Patient presents with     Consult     establish care with psoriatic arthritis, tzimmer cma       Initial BP (!) 137/93  Pulse 90  Ht 1.753 m (5' 9\")  Wt 110.2 kg (242 lb 14.4 oz)  BMI 35.87 kg/m2 Estimated body mass index is 35.87 kg/(m^2) as calculated from the following:    Height as of this encounter: 1.753 m (5' 9\").    Weight as of this encounter: 110.2 kg (242 lb 14.4 oz).  Medication Reconciliation: complete    "

## 2018-04-25 NOTE — PROGRESS NOTES
[facility] - Rheumatology Clinic Visit        Primary care provider: Lilliana Bailey    Leon Samuels  is a 37 year old to establish care for psoriatic arthritis, psoriasis. Humira since . Dramatic improved with prednisone. Here with spouse      Past: Methotrexate (not fully effective, not helped with skin, head fuzzy) and never started the leflunomide.     Taking humira 40 mg injection every 2 weeks, tolerating well. No infections and asthma is controlled. Reports gained like 30 lbs since starting the humira. Dramatic improvement in control of psoriasis, inflammation (shoulders, knees, ankles, hips and back) with no pain and dramatic improved QoL. Before humira, not able to move or lift arms over head due to the pain in shoulder, walked like he was getting off a horse, major joints with swelling and changes in his nails---all resolved but when missed his dose (ran out all this started to come back in a few days). Mild symptoms few days before next dose due. Inflammatory back sx resolved. Some DDD lumbar spine but no radiculopathy and does not wish for PT. Snoring and wife thinks he has FAMILIA. Energy is fair. No s/sx DM. No dactylitis or tendonitis. Mild sx left 2nd MCP. No prednisone and only rare advil when due for humira (general LBP ache).     PMH:  Medical-Influenza B, Ashtma, PsA, psoriasis, lumbar DDD   Surgical-None   No blood transfusions  Injury-None     FH:  No autoimmune disorders, UC, crohn's, SLE, PsA, gout.  No MS or cancer in family  Mother-healthy  GM-DM   Father-HTN, CVIs, smoker   Maternal side Psoriatic, PsA, RA   Siblings-healthy  Children-3 --1 has asthma  FAMILIA in family      SH:  Alcohol. Smoking. No IVDU. +Chew Children. Right or left handed. . Exavacting worker     PMSH personally reviewed and updated by me.    ROS:  Negative raynaud s phenomena, hairloss, sun sensitivities, keratoconjunctivitis sicca, pleurisy, inflammatory joint symptoms, significant  "rashes like malar, oral/nasal or ulcerations, inflammatory eye disease, inflammatory bowel disease, dactylitis, tenosynovitis, or enthespathy. Negative blood clots, gout, UC, crohn's. No temporal headache, no jaw claudication, no scalp tenderness, vision changes, carotidynia, cough. No STD or pregnancy symptoms. No Parotid swelling  CONSTITUTIONAL: No fevers, night sweats or unintentional weight change. No acute distress, swollen glands  EYES: No vision change, diplopia, pain in eyes or red eyes   EARS, NOSE, MOUTH, THROAT: No tinnitus or hearing change, no epistaxis or nasal discharge, no oral lesions, throat clear. Normal saliva pool.  No drymouth.   CARDIOVASCULAR: No chest pain, palpitations, or pain with walking, no orthopnea or PND   RESPIRATORY: No dyspnea, cough, shortness of breath or wheezing. No pleurisy.   GI: No nausea, vomiting, diarrhea or constipation, no abdominal pain, or blood in stools.   : No change in urine, no dysuria or hematuria   MUSCKL: No swollen, tender, red or painful joints. No nodules. No enthesitis, plantar fascitis or heel pain.   INTEGUMENTARY: No concerning lesions or moles   NEURO: No loss of strength or sensation, no numbness or tingling, no tremor, no dizziness, no headache. No falls   ENDO: No polyuria or polydipsia, no temperature intolerance   HEME/LYMPH:No concerning bumps, bleeding problems, or swollen lymph nodes. No recent infections, hospitalizations or new illnesses.   ALLERGY: No environmental allergies   PSYCH:No depression or anxiety, no sleep problems.  Otherwise 14 point ROS obtained, reviewed and found negative.     Physical exam:  Vitals: Blood pressure (!) 137/93, pulse 90, height 1.753 m (5' 9\"), weight 110.2 kg (242 lb 14.4 oz).  Wt Readings from Last 4 Encounters:   04/25/18 110.2 kg (242 lb 14.4 oz)   11/21/17 106.6 kg (235 lb)   04/11/17 105.7 kg (233 lb)   04/07/17 104.3 kg (230 lb)     Constitutional: WD-WN-WG cooperative  Eyes: nl EOM, PERRLA, vision, " conjunctiva, sclera  ENT: nl external ears, nose, hearing, lips, teeth, gums, throat  Neck: no mass or thyroid enlargement  Resp: lungs clear to auscultation, nl to palpation, nl effort  CV: RRR, no murmurs, rubs or gallops, no edema  GI: no ABD mass or tenderness, no HSM  : not tested  Lymph: no cervical or epitrochlear nodes  MS: All TMJ, neck, shoulder, elbow, wrist, hand, spine, hip, knee, ankle, and foot joints were examined and otherwise found normal. Normal  strength. No active synovitis or deformity. Full ROM. Normal prayer sign. Negative Negative Lhermitte's sign. No periuncle erythema  Skin: no nail pitting, alopecia, rash  Neuro: nl cranial nerves, strength, sensation, DTRs.   Psych: nl judgement, orientation, memory, affect.      Labs/Imaging:  Results for orders placed or performed during the hospital encounter of 04/07/17   XR Chest 2 Views    Narrative    CHEST TWO VIEWS   4/7/2017 1:19 PM     HISTORY: Chest pain. Shortness of breath.    COMPARISON: None.    FINDINGS: Minimal linear opacity, left base, likely is fibrosis.  Nothing clearly acute. Heart size normal.      Impression    IMPRESSION: Minimal linear opacity, left base, likely fibrosis.    LATRICE COVINGTON MD   CBC with platelets differential   Result Value Ref Range    WBC 7.4 4.0 - 11.0 10e9/L    RBC Count 5.08 4.4 - 5.9 10e12/L    Hemoglobin 16.1 13.3 - 17.7 g/dL    Hematocrit 45.8 40.0 - 53.0 %    MCV 90 78 - 100 fl    MCH 31.7 26.5 - 33.0 pg    MCHC 35.2 31.5 - 36.5 g/dL    RDW 13.0 10.0 - 15.0 %    Platelet Count 218 150 - 450 10e9/L    Diff Method Automated Method     % Neutrophils 84.0 %    % Lymphocytes 10.6 %    % Monocytes 4.6 %    % Eosinophils 0.1 %    % Basophils 0.4 %    % Immature Granulocytes 0.3 %    Nucleated RBCs 0 0 /100    Absolute Neutrophil 6.2 1.6 - 8.3 10e9/L    Absolute Lymphocytes 0.8 0.8 - 5.3 10e9/L    Absolute Monocytes 0.3 0.0 - 1.3 10e9/L    Absolute Eosinophils 0.0 0.0 - 0.7 10e9/L    Absolute Basophils  0.0 0.0 - 0.2 10e9/L    Abs Immature Granulocytes 0.0 0 - 0.4 10e9/L    Absolute Nucleated RBC 0.0    Basic metabolic panel   Result Value Ref Range    Sodium 136 133 - 144 mmol/L    Potassium 3.9 3.4 - 5.3 mmol/L    Chloride 102 94 - 109 mmol/L    Carbon Dioxide 25 20 - 32 mmol/L    Anion Gap 9 3 - 14 mmol/L    Glucose 112 (H) 70 - 99 mg/dL    Urea Nitrogen 12 7 - 30 mg/dL    Creatinine 1.30 (H) 0.66 - 1.25 mg/dL    GFR Estimate 62 >60 mL/min/1.7m2    GFR Estimate If Black 75 >60 mL/min/1.7m2    Calcium 9.0 8.5 - 10.1 mg/dL   Hepatic panel   Result Value Ref Range    Bilirubin Direct 0.2 0.0 - 0.2 mg/dL    Bilirubin Total 1.1 0.2 - 1.3 mg/dL    Albumin 4.1 3.4 - 5.0 g/dL    Protein Total 7.9 6.8 - 8.8 g/dL    Alkaline Phosphatase 96 40 - 150 U/L    ALT 69 0 - 70 U/L    AST 38 0 - 45 U/L   ISTAT gases lactate obed POCT   Result Value Ref Range    Ph Venous 7.43 7.32 - 7.43 pH    PCO2 Venous 33 (L) 40 - 50 mm Hg    PO2 Venous 27 25 - 47 mm Hg    Bicarbonate Venous 22 21 - 28 mmol/L    O2 Sat Venous 54 %    Lactic Acid 1.4 0.7 - 2.1 mmol/L   Influenza A/B antigen   Result Value Ref Range    Influenza A/B Agn Specimen Nasal     Influenza A Negative NEG    Influenza B (A) NEG     Positive   Test results must be correlated with clinical data. If necessary, results   should be confirmed by a molecular assay or viral culture.         Impression/Plan:    1. Psoriatic arthritis, controlled on humira now allowing stop of NSAID and prednisone but will have more IA (moderate joints and back few day before due or if out). Exam negative. +FH psoriasis, PsA, RA. I will do RF/CCP given not done to document.  Given significant response to humira, he and I agree this to be continued and there is a risk of another option not controlling his disesae or skin  2. Psoriasis, controlled on humira now  3. Weight gain with fatigue. Discussed anti-TNF with [facility] (not on label, maybe physiologic response if feels better as when not  inflammatory suppresses appetite). Check TSH, HGb A1C and UA. +Snoring with HTN. Needs complete annual physical with PCP, to include sleep study as he has sx suggestive of FAMILIA (declines referral. Exercise and work on diet. Educated on risks associated with #1-2, ASCVD and DM.   4. LBP, prior imaging DDD. No radiculopathy. Hx suggestive of inflammatory back but is controlled now so will not image. Decline PT    Humira 40 mg SQ PEN every 14 days  Hold for infections  Check hep B.C, MTb QG; and baseline labs, labs.  Recommend pneumonia 13 vaccine then wait 8 week to get pneumonia 23 vaccine. Declines today       The patient understood the rational for the diagnosis and treatment plan. Patient shared in the decision making. All questions were answered to best of my ability and the patient's satisfaction  Aniket HO, CNP, MSN  [facility]  Department of Rheumatology & Autoimmune Disorders

## 2018-04-25 NOTE — MR AVS SNAPSHOT
After Visit Summary   4/25/2018    Leon Samuels    MRN: [id number]           Patient Information     Date Of Birth          1980        Visit Information        Provider Department      4/25/2018 1:00 PM Aniket Henriquez APRN CNP [facility] Rheumatology        Today's Diagnoses     Psoriatic arthritis (H)    -  1    Psoriasis        Immunosuppression (H)        Fatigue, unspecified type        Weight gain          Care Instructions    Pneumonia 13 vaccine then wait at least 8 weeks later to get the pneumonia 23 vaccine.           Follow-ups after your visit        Follow-up notes from your care team     Return in about 6 months (around 10/25/2018) for Annual Physical Exam with Primary Care Provider, Labs Today.      Your next 10 appointments already scheduled     Apr 25, 2018  2:15 PM CDT   Lab with  LAB   [facility] Lab ([facility])    [address]  1st Floor  [place] [id number] [phone]            Oct 17, 2018  9:00 AM CDT   (Arrive by 8:45 AM)   Return Visit with GEORGIA Oscar CNP   [facility] Rheumatology ([facility])    [address]  [address]  [place] [address] [phone]              Future tests that were ordered for you today     Open Future Orders        Priority Expected Expires Ordered    Routine UA with Micro Reflex to Culture Routine 4/25/2018 5/25/2018 4/25/2018    Hemoglobin A1c Routine 4/25/2018 6/25/2018 4/25/2018    TSH with free T4 reflex Routine 4/25/2018 5/25/2018 4/25/2018    Hepatitis B core antibody Routine 4/25/2018 5/4/2018 4/25/2018    Hepatitis B surface antigen Routine 4/25/2018 5/4/2018 4/25/2018    Hepatitis C antibody Routine 4/25/2018 5/4/2018 4/25/2018    M Tuberculosis by Quantiferon Routine 4/25/2018 5/4/2018 4/25/2018    CBC with platelets differential Routine 4/25/2018 5/4/2018 4/25/2018    AST Routine 4/25/2018 5/4/2018 4/25/2018    ALT Routine 4/25/2018  "5/4/2018 4/25/2018    Albumin level Routine 4/25/2018 5/4/2018 4/25/2018    Creatinine Routine 4/25/2018 5/4/2018 4/25/2018    Cyclic Citrullinated Peptide Antibody IgG Routine 4/25/2018 5/4/2018 4/25/2018    Rheumatoid factor Routine 4/25/2018 5/4/2018 4/25/2018            Who to contact     If you have questions or need follow up information about today's clinic visit or your schedule please contact [facility] RHEUMATOLOGY directly at [phone].  Normal or non-critical lab and imaging results will be communicated to you by Evitihart, letter or phone within 4 business days after the clinic has received the results. If you do not hear from us within 7 days, please contact the clinic through Kukunu or phone. If you have a critical or abnormal lab result, we will notify you by phone as soon as possible.  Submit refill requests through Kukunu or call your pharmacy and they will forward the refill request to us. Please allow 3 business days for your refill to be completed.          Additional Information About Your Visit        Evitihart Information     Kukunu gives you secure access to your electronic health record. If you see a primary care provider, you can also send messages to your care team and make appointments. If you have questions, please call your primary care clinic.  If you do not have a primary care provider, please call [phone] and they will assist you.        Care EveryWhere ID     This is your Care EveryWhere ID. This could be used by other organizations to access your [facility] medical records  [id number]        Your Vitals Were     Pulse Height BMI (Body Mass Index)             90 1.753 m (5' 9\") 35.87 kg/m2          Blood Pressure from Last 3 Encounters:   04/25/18 (!) 137/93   11/21/17 131/88   04/11/17 122/83    Weight from Last 3 Encounters:   04/25/18 110.2 kg (242 lb 14.4 oz)   11/21/17 106.6 kg (235 lb)   04/11/17 105.7 kg (233 lb)                 Today's Medication Changes          These " changes are accurate as of 4/25/18  2:03 PM.  If you have any questions, ask your nurse or doctor.               Stop taking these medicines if you haven't already. Please contact your care team if you have questions.     leflunomide 20 MG tablet   Commonly known as:  ARAVA   Stopped by:  Aniket Henriquez APRN CNP           methotrexate 2.5 MG tablet CHEMO   Stopped by:  Aniket Henriquez APRN CNP           predniSONE 5 MG tablet   Commonly known as:  DELTASONE   Stopped by:  Aniket Henriquez APRN CNP                Where to get your medicines      Call your pharmacy to confirm that your medication is ready for pickup. It may take up to 24 hours for them to receive the prescription. If the prescription is not ready within 3 business days, please contact your clinic or your provider.     We will let you know when these medications are ready. If you don't hear back within 3 business days, please contact us.     adalimumab 40 MG/0.8ML pen kit                Primary Care Provider Office Phone # Fax #    Lilliana Bailey [phone]       [address] [facility] [place] [address]        Equal Access to Services     [facility]: Hadii ajay dunn Soleobardo, waaxda erum, qaybta efren hernandez, magalis blevins . So [facility] [phone].    ATENCIÓN: Si habla español, tiene a rodriguez disposición servicios gratuitos de asistencia lingüística. [facility] [phone].    We comply with applicable federal civil rights laws and [place] laws. We do not discriminate on the basis of race, color, national origin, age, disability, sex, sexual orientation, or gender identity.            Thank you!     Thank you for choosing [facility] RHEUMATOLOGY  for your care. Our goal is always to provide you with excellent care. Hearing back from our patients is one way we can continue to improve our services. Please take a few minutes to complete the written survey that you may receive in the  mail after your visit with us. Thank you!             Your Updated Medication List - Protect others around you: Learn how to safely use, store and throw away your medicines at www.disposemymeds.org.          This list is accurate as of 4/25/18  2:03 PM.  Always use your most recent med list.                   Brand Name Dispense Instructions for use Diagnosis    adalimumab 40 MG/0.8ML pen kit    HUMIRA    1 kit    Inject 0.8 mLs (40 mg) Subcutaneous every 14 days    Psoriatic arthritis (H)       ADVIL PO      Take 200 mg by mouth as needed for moderate pain    Psoriatic arthritis (H), Psoriasis, Immunosuppression (H)       albuterol 108 (90 Base) MCG/ACT Inhaler    PROAIR HFA/PROVENTIL HFA/VENTOLIN HFA     Inhale 2 puffs into the lungs every 6 hours        QVAR IN      Inhale 2 puffs into the lungs 2 times daily

## 2018-04-25 NOTE — LETTER
4/25/2018       RE: Leon Samuels  [address] [place] [address]     Dear Colleague,    Thank you for referring your patient, Leon Samuels, to the [facility] RHEUMATOLOGY at [facility]. Please see a copy of my visit note below.    [facility] - Rheumatology Clinic Visit        Primary care provider: Lilliana Bailey    Leon Samuels  is a 37 year old to establish care for psoriatic arthritis, psoriasis. Humira since . Dramatic improved with prednisone. Here with spouse      Past: Methotrexate (not fully effective, not helped with skin, head fuzzy) and never started the leflunomide.     Taking humira 40 mg injection every 2 weeks, tolerating well. No infections and asthma is controlled. Reports gained like 30 lbs since starting the humira. Dramatic improvement in control of psoriasis, inflammation (shoulders, knees, ankles, hips and back) with no pain and dramatic improved QoL. Before humira, not able to move or lift arms over head due to the pain in shoulder, walked like he was getting off a horse, major joints with swelling and changes in his nails---all resolved but when missed his dose (ran out all this started to come back in a few days). Mild symptoms few days before next dose due. Inflammatory back sx resolved. Some DDD lumbar spine but no radiculopathy and does not wish for PT. Snoring and wife thinks he has FAMILIA. Energy is fair. No s/sx DM. No dactylitis or tendonitis. Mild sx left 2nd MCP. No prednisone and only rare advil when due for humira (general LBP ache).     PMH:  Medical-Influenza B, Ashtma, PsA, psoriasis, lumbar DDD   Surgical-None   No blood transfusions  Injury-None     FH:  No autoimmune disorders, UC, crohn's, SLE, PsA, gout.  No MS or cancer in family  Mother-healthy  GM-DM   Father-HTN, CVIs, smoker   Maternal side Psoriatic, PsA, RA   Siblings-healthy  Children-3 --1 has asthma  FAMILIA in family       SH:  Alcohol. Smoking. No IVDU. +Chew Children. Right or left handed. . [employer] worker     [facility] personally reviewed and updated by me.    ROS:  Negative raynaud s phenomena, hairloss, sun sensitivities, keratoconjunctivitis sicca, pleurisy, inflammatory joint symptoms, significant rashes like malar, oral/nasal or ulcerations, inflammatory eye disease, inflammatory bowel disease, dactylitis, tenosynovitis, or enthespathy. Negative blood clots, gout, UC, crohn's. No temporal headache, no jaw claudication, no scalp tenderness, vision changes, carotidynia, cough. No STD or pregnancy symptoms. No Parotid swelling  CONSTITUTIONAL: No fevers, night sweats or unintentional weight change. No acute distress, swollen glands  EYES: No vision change, diplopia, pain in eyes or red eyes   EARS, NOSE, MOUTH, THROAT: No tinnitus or hearing change, no epistaxis or nasal discharge, no oral lesions, throat clear. Normal saliva pool.  No drymouth.   CARDIOVASCULAR: No chest pain, palpitations, or pain with walking, no orthopnea or PND   RESPIRATORY: No dyspnea, cough, shortness of breath or wheezing. No pleurisy.   GI: No nausea, vomiting, diarrhea or constipation, no abdominal pain, or blood in stools.   : No change in urine, no dysuria or hematuria   MUSCKL: No swollen, tender, red or painful joints. No nodules. No enthesitis, plantar fascitis or heel pain.   INTEGUMENTARY: No concerning lesions or moles   NEURO: No loss of strength or sensation, no numbness or tingling, no tremor, no dizziness, no headache. No falls   ENDO: No polyuria or polydipsia, no temperature intolerance   HEME/LYMPH:No concerning bumps, bleeding problems, or swollen lymph nodes. No recent infections, hospitalizations or new illnesses.   ALLERGY: No environmental allergies   PSYCH:No depression or anxiety, no sleep problems.  Otherwise 14 point ROS obtained, reviewed and found negative.     Physical exam:  Vitals: Blood pressure (!) 137/93,  "pulse 90, height 1.753 m (5' 9\"), weight 110.2 kg (242 lb 14.4 oz).  Wt Readings from Last 4 Encounters:   04/25/18 110.2 kg (242 lb 14.4 oz)   11/21/17 106.6 kg (235 lb)   04/11/17 105.7 kg (233 lb)   04/07/17 104.3 kg (230 lb)     Constitutional: WD-WN-WG cooperative  Eyes: nl EOM, PERRLA, vision, conjunctiva, sclera  ENT: nl external ears, nose, hearing, lips, teeth, gums, throat  Neck: no mass or thyroid enlargement  Resp: lungs clear to auscultation, nl to palpation, nl effort  CV: RRR, no murmurs, rubs or gallops, no edema  GI: no ABD mass or tenderness, no HSM  : not tested  Lymph: no cervical or epitrochlear nodes  MS: All TMJ, neck, shoulder, elbow, wrist, hand, spine, hip, knee, ankle, and foot joints were examined and otherwise found normal. Normal  strength. No active synovitis or deformity. Full ROM. Normal prayer sign. Negative Negative Lhermitte's sign. No periuncle erythema  Skin: no nail pitting, alopecia, rash  Neuro: nl cranial nerves, strength, sensation, DTRs.   Psych: nl judgement, orientation, memory, affect.      Labs/Imaging:  Results for orders placed or performed during the hospital encounter of 04/07/17   XR Chest 2 Views    Narrative    CHEST TWO VIEWS   4/7/2017 1:19 PM     HISTORY: Chest pain. Shortness of breath.    COMPARISON: None.    FINDINGS: Minimal linear opacity, left base, likely is fibrosis.  Nothing clearly acute. Heart size normal.      Impression    IMPRESSION: Minimal linear opacity, left base, likely fibrosis.    LATRICE COVINGTON MD   CBC with platelets differential   Result Value Ref Range    WBC 7.4 4.0 - 11.0 10e9/L    RBC Count 5.08 4.4 - 5.9 10e12/L    Hemoglobin 16.1 13.3 - 17.7 g/dL    Hematocrit 45.8 40.0 - 53.0 %    MCV 90 78 - 100 fl    MCH 31.7 26.5 - 33.0 pg    MCHC 35.2 31.5 - 36.5 g/dL    RDW 13.0 10.0 - 15.0 %    Platelet Count 218 150 - 450 10e9/L    Diff Method Automated Method     % Neutrophils 84.0 %    % Lymphocytes 10.6 %    % Monocytes 4.6 % "    % Eosinophils 0.1 %    % Basophils 0.4 %    % Immature Granulocytes 0.3 %    Nucleated RBCs 0 0 /100    Absolute Neutrophil 6.2 1.6 - 8.3 10e9/L    Absolute Lymphocytes 0.8 0.8 - 5.3 10e9/L    Absolute Monocytes 0.3 0.0 - 1.3 10e9/L    Absolute Eosinophils 0.0 0.0 - 0.7 10e9/L    Absolute Basophils 0.0 0.0 - 0.2 10e9/L    Abs Immature Granulocytes 0.0 0 - 0.4 10e9/L    Absolute Nucleated RBC 0.0    Basic metabolic panel   Result Value Ref Range    Sodium 136 133 - 144 mmol/L    Potassium 3.9 3.4 - 5.3 mmol/L    Chloride 102 94 - 109 mmol/L    Carbon Dioxide 25 20 - 32 mmol/L    Anion Gap 9 3 - 14 mmol/L    Glucose 112 (H) 70 - 99 mg/dL    Urea Nitrogen 12 7 - 30 mg/dL    Creatinine 1.30 (H) 0.66 - 1.25 mg/dL    GFR Estimate 62 >60 mL/min/1.7m2    GFR Estimate If Black 75 >60 mL/min/1.7m2    Calcium 9.0 8.5 - 10.1 mg/dL   Hepatic panel   Result Value Ref Range    Bilirubin Direct 0.2 0.0 - 0.2 mg/dL    Bilirubin Total 1.1 0.2 - 1.3 mg/dL    Albumin 4.1 3.4 - 5.0 g/dL    Protein Total 7.9 6.8 - 8.8 g/dL    Alkaline Phosphatase 96 40 - 150 U/L    ALT 69 0 - 70 U/L    AST 38 0 - 45 U/L   ISTAT gases lactate obed POCT   Result Value Ref Range    Ph Venous 7.43 7.32 - 7.43 pH    PCO2 Venous 33 (L) 40 - 50 mm Hg    PO2 Venous 27 25 - 47 mm Hg    Bicarbonate Venous 22 21 - 28 mmol/L    O2 Sat Venous 54 %    Lactic Acid 1.4 0.7 - 2.1 mmol/L   Influenza A/B antigen   Result Value Ref Range    Influenza A/B Agn Specimen Nasal     Influenza A Negative NEG    Influenza B (A) NEG     Positive   Test results must be correlated with clinical data. If necessary, results   should be confirmed by a molecular assay or viral culture.         Impression/Plan:    1. Psoriatic arthritis, controlled on humira now allowing stop of NSAID and prednisone but will have more IA (moderate joints and back few day before due or if out). Exam negative. +FH psoriasis, PsA, RA. I will do RF/CCP given not done to document.  Given significant response  to humira, he and I agree this to be continued and there is a risk of another option not controlling his disesae or skin  2. Psoriasis, controlled on humira now  3. Weight gain with fatigue. Discussed anti-TNF with [facility] (not on label, maybe physiologic response if feels better as when not inflammatory suppresses appetite). Check TSH, HGb A1C and UA. +Snoring with HTN. Needs complete annual physical with PCP, to include sleep study as he has sx suggestive of FAMILIA (declines referral. Exercise and work on diet. Educated on risks associated with #1-2, ASCVD and DM.   4. LBP, prior imaging DDD. No radiculopathy. Hx suggestive of inflammatory back but is controlled now so will not image. Decline PT    Humira 40 mg SQ PEN every 14 days  Hold for infections  Check hep B.C, MTb QG; and baseline labs, labs.  Recommend pneumonia 13 vaccine then wait 8 week to get pneumonia 23 vaccine. Declines today       The patient understood the rational for the diagnosis and treatment plan. Patient shared in the decision making. All questions were answered to best of my ability and the patient's satisfaction  Aniket HO, CNP, MSN  [facility]  Department of Rheumatology & Autoimmune Disorders

## 2018-04-26 LAB
CCP AB SER IA-ACNC: 1 U/ML
HBV CORE AB SERPL QL IA: NONREACTIVE
HBV SURFACE AG SERPL QL IA: NONREACTIVE
HCV AB SERPL QL IA: NONREACTIVE
MITOCHONDRIA M2 IGG SER-ACNC: 2 U/ML
RHEUMATOID FACT SER NEPH-ACNC: <20 IU/ML (ref 0–20)

## 2018-04-27 LAB
M TB TUBERC IFN-G BLD QL: NEGATIVE
M TB TUBERC IFN-G/MITOGEN IGNF BLD: 0.01 IU/ML

## 2018-04-28 LAB — SMA IGG SER-ACNC: 8 UNITS (ref 0–19)

## 2018-04-30 ENCOUNTER — TELEPHONE (OUTPATIENT)
Dept: RHEUMATOLOGY | Facility: CLINIC | Age: 38
End: 2018-04-30

## 2018-04-30 NOTE — PROGRESS NOTES
+elevated liver enzymes, autoimmune hepatitis and hepatitis B/C are negative. I would advise him to schedule a physical with his PCP for further evaluation, avoid alcohol and NSAIDs, lose weight as WISEMAN can be a possibility but he does need to see PCP and monitor with PCP. The PCP can decide about more testing, possible ultrasound. He has MyChart but I dont see he has gone in there.    Kidney, blood counts, thyroid, HGB A1C (Diabetes), urine, TB, RA are normal. Please contact him with the results.

## 2018-04-30 NOTE — TELEPHONE ENCOUNTER
Labs: elevated liver enzymes    +elevated liver enzymes. The autoimmune hepatitis and hepatitis B/C are negative. I would advise him to schedule a physical with his PCP for further evaluation, avoid alcohol and NSAIDs, lose weight as WISEMAN can be a possibility but he does need to see PCP and monitor with PCP. The PCP can decide about more testing, possible ultrasound. He has MyChart but I dont see he has gone in there.    Kidney, blood counts, thyroid, HGB A1C (Diabetes), urine, TB, RA are normal. Please contact him with the results.     Results for orders placed or performed in visit on 04/25/18   Hepatitis B core antibody   Result Value Ref Range    Hepatitis B Core Love Nonreactive NR^Nonreactive   Hepatitis B surface antigen   Result Value Ref Range    Hep B Surface Agn Nonreactive NR^Nonreactive   Hepatitis C antibody   Result Value Ref Range    Hepatitis C Antibody Nonreactive NR^Nonreactive   M Tuberculosis by Quantiferon   Result Value Ref Range    M Tuberculosis Result Negative NEG^Negative    M Tuberculosis Antigen Value 0.01 IU/mL   CBC with platelets differential   Result Value Ref Range    WBC 6.4 4.0 - 11.0 10e9/L    RBC Count 5.32 4.4 - 5.9 10e12/L    Hemoglobin 16.9 13.3 - 17.7 g/dL    Hematocrit 47.5 40.0 - 53.0 %    MCV 89 78 - 100 fl    MCH 31.8 26.5 - 33.0 pg    MCHC 35.6 31.5 - 36.5 g/dL    RDW 12.1 10.0 - 15.0 %    Platelet Count 220 150 - 450 10e9/L    Diff Method Automated Method     % Neutrophils 46.9 %    % Lymphocytes 40.4 %    % Monocytes 9.1 %    % Eosinophils 3.0 %    % Basophils 0.3 %    % Immature Granulocytes 0.3 %    Nucleated RBCs 0 0 /100    Absolute Neutrophil 3.0 1.6 - 8.3 10e9/L    Absolute Lymphocytes 2.6 0.8 - 5.3 10e9/L    Absolute Monocytes 0.6 0.0 - 1.3 10e9/L    Absolute Eosinophils 0.2 0.0 - 0.7 10e9/L    Absolute Basophils 0.0 0.0 - 0.2 10e9/L    Abs Immature Granulocytes 0.0 0 - 0.4 10e9/L    Absolute Nucleated RBC 0.0    AST   Result Value Ref Range    AST 47 (H) 0 - 45  U/L   ALT   Result Value Ref Range     (H) 0 - 70 U/L   Albumin level   Result Value Ref Range    Albumin 4.4 3.4 - 5.0 g/dL   Creatinine   Result Value Ref Range    Creatinine 1.20 0.66 - 1.25 mg/dL    GFR Estimate 68 >60 mL/min/1.7m2    GFR Estimate If Black 82 >60 mL/min/1.7m2   Cyclic Citrullinated Peptide Antibody IgG   Result Value Ref Range    Cyclic Citrullinated Peptide Antibody, IgG 1 <7 U/mL   Rheumatoid factor   Result Value Ref Range    Rheumatoid Factor <20 <20 IU/mL   TSH with free T4 reflex   Result Value Ref Range    TSH 1.07 0.40 - 4.00 mU/L   Hemoglobin A1c   Result Value Ref Range    Hemoglobin A1C 5.7 0 - 6.4 %   Routine UA with Micro Reflex to Culture   Result Value Ref Range    Color Urine Yellow     Appearance Urine Clear     Glucose Urine Negative NEG^Negative mg/dL    Bilirubin Urine Negative NEG^Negative    Ketones Urine Negative NEG^Negative mg/dL    Specific Gravity Urine 1.020 1.003 - 1.035    Blood Urine Negative NEG^Negative    pH Urine 6.0 5.0 - 7.0 pH    Protein Albumin Urine Negative NEG^Negative mg/dL    Urobilinogen mg/dL 0.0 0.0 - 2.0 mg/dL    Nitrite Urine Negative NEG^Negative    Leukocyte Esterase Urine Negative NEG^Negative    Source Midstream Urine     WBC Urine <1 0 - 5 /HPF    RBC Urine <1 0 - 2 /HPF   F Actin EIA with reflex   Result Value Ref Range    F-Actin Antibody IgG 8 0 - 19 Units   Mitochondrial M2 Antibody IgG   Result Value Ref Range    Mitochondrial M2 Antibody IgG 2 <4 U/mL

## 2018-05-01 NOTE — TELEPHONE ENCOUNTER
Called and updated pt with Aniket Henriquez NP notes about results. Relayed message below and that pt does need to see PCP for further follow up about elevated liver enzymes.  Pt expressed understanding and had no further questions.    Kaylee Wang RN  Rheumatology Clinic

## 2019-02-11 ENCOUNTER — TELEPHONE (OUTPATIENT)
Dept: RHEUMATOLOGY | Facility: CLINIC | Age: 39
End: 2019-02-11

## 2019-02-11 NOTE — TELEPHONE ENCOUNTER
[facility] Call Center    Phone Message    May a detailed message be left on voicemail: yes    Reason for Call: Other: The pt states that he he called for a refill of Humira to [facility], they said he needs a prior auth for this med, even tho he had 2 refills left. Can you start the process and let the pt know the status. Thanks.     Action Taken: Message routed to:  [facility] ([facility]): pamela rheum`3

## 2019-02-12 ENCOUNTER — TELEPHONE (OUTPATIENT)
Dept: RHEUMATOLOGY | Facility: CLINIC | Age: 39
End: 2019-02-12

## 2019-02-12 NOTE — TELEPHONE ENCOUNTER
PA Initiation    Medication: HUMIRA   Insurance Company: [employer] Part D - Phone [phone] Fax [phone]  Pharmacy Filling the Rx: [facility] [place] [place] - [address]  Filling Pharmacy Phone:    Filling Pharmacy Fax:    Start Date: 2/12/2019

## 2019-02-12 NOTE — TELEPHONE ENCOUNTER
Prior Authorization Approval    Authorization Effective Date: 2/12/2019  Authorization Expiration Date: 2/12/2020  Medication: HUMIRA - Approved  Approved Dose/Quantity: 2 FOR 28 DAYS  Reference #: [id number]   Insurance Company: [employer] Part D - Phone [phone] Fax [phone]  Expected CoPay: UNKNOWN      CoPay Card Available:      Foundation Assistance Needed:    Which Pharmacy is filling the prescription (Not needed for infusion/clinic administered): [facility] [place], [place] - [address] [address] [facility]  Pharmacy Notified: Yes - approval faxed  Patient Notified: Yes - via [place]

## 2019-02-18 ENCOUNTER — OFFICE VISIT (OUTPATIENT)
Dept: RHEUMATOLOGY | Facility: CLINIC | Age: 39
End: 2019-02-18
Attending: NURSE PRACTITIONER
Payer: COMMERCIAL

## 2019-02-18 VITALS
TEMPERATURE: 97.6 F | WEIGHT: 249.1 LBS | HEART RATE: 62 BPM | BODY MASS INDEX: 36.89 KG/M2 | SYSTOLIC BLOOD PRESSURE: 143 MMHG | DIASTOLIC BLOOD PRESSURE: 88 MMHG | OXYGEN SATURATION: 94 % | HEIGHT: 69 IN

## 2019-02-18 DIAGNOSIS — D84.9 IMMUNOSUPPRESSION (H): ICD-10-CM

## 2019-02-18 DIAGNOSIS — L40.50 PSORIATIC ARTHRITIS (H): Primary | ICD-10-CM

## 2019-02-18 DIAGNOSIS — R74.8 ELEVATED LIVER ENZYMES: ICD-10-CM

## 2019-02-18 DIAGNOSIS — L40.50 PSORIATIC ARTHRITIS (H): ICD-10-CM

## 2019-02-18 LAB
ALBUMIN SERPL-MCNC: 4 G/DL (ref 3.4–5)
ALT SERPL W P-5'-P-CCNC: 106 U/L (ref 0–70)
AST SERPL W P-5'-P-CCNC: 43 U/L (ref 0–45)
CREAT SERPL-MCNC: 1.2 MG/DL (ref 0.66–1.25)
CRP SERPL-MCNC: <2.9 MG/L (ref 0–8)
ERYTHROCYTE [DISTWIDTH] IN BLOOD BY AUTOMATED COUNT: 12.3 % (ref 10–15)
GFR SERPL CREATININE-BSD FRML MDRD: 76 ML/MIN/{1.73_M2}
HCT VFR BLD AUTO: 49.9 % (ref 40–53)
HGB BLD-MCNC: 16.6 G/DL (ref 13.3–17.7)
MCH RBC QN AUTO: 30.9 PG (ref 26.5–33)
MCHC RBC AUTO-ENTMCNC: 33.3 G/DL (ref 31.5–36.5)
MCV RBC AUTO: 93 FL (ref 78–100)
PLATELET # BLD AUTO: 240 10E9/L (ref 150–450)
RBC # BLD AUTO: 5.37 10E12/L (ref 4.4–5.9)
WBC # BLD AUTO: 5.8 10E9/L (ref 4–11)

## 2019-02-18 PROCEDURE — 86140 C-REACTIVE PROTEIN: CPT | Performed by: NURSE PRACTITIONER

## 2019-02-18 PROCEDURE — 36415 COLL VENOUS BLD VENIPUNCTURE: CPT | Performed by: NURSE PRACTITIONER

## 2019-02-18 PROCEDURE — 84450 TRANSFERASE (AST) (SGOT): CPT | Performed by: NURSE PRACTITIONER

## 2019-02-18 PROCEDURE — 82040 ASSAY OF SERUM ALBUMIN: CPT | Performed by: NURSE PRACTITIONER

## 2019-02-18 PROCEDURE — 84460 ALANINE AMINO (ALT) (SGPT): CPT | Performed by: NURSE PRACTITIONER

## 2019-02-18 PROCEDURE — 82565 ASSAY OF CREATININE: CPT | Performed by: NURSE PRACTITIONER

## 2019-02-18 PROCEDURE — 85027 COMPLETE CBC AUTOMATED: CPT | Performed by: NURSE PRACTITIONER

## 2019-02-18 PROCEDURE — G0463 HOSPITAL OUTPT CLINIC VISIT: HCPCS | Mod: ZF

## 2019-02-18 ASSESSMENT — MIFFLIN-ST. JEOR: SCORE: 2040.29

## 2019-02-18 ASSESSMENT — PAIN SCALES - GENERAL: PAINLEVEL: NO PAIN (0)

## 2019-02-18 NOTE — LETTER
2/18/2019      RE: Leon Samuels  [id number] [facility]   [place] [address]       [facility] - Rheumatology Clinic Visit     Leon Samuels  is a 38 year old follow-up psoriatic arthritis, psoriasis. Humira since . Here with spouse. 4-2018 -RF -CCP - Factin - Mitochondrial  AST 47 -Hep B/c, MTB QG HGB A1C 5.7      Past: Methotrexate (not fully effective, not helped with skin, head fuzzy) and never started the leflunomide. Prednisone, dramatic response    Copy forward  Initial visit 4-2018: Taking humira 40 mg injection every 2 weeks, tolerating well. No infections and asthma is controlled. Reports gained like 30 lbs since starting the humira. Dramatic improvement in control of psoriasis, inflammation (shoulders, knees, ankles, hips and back) with no pain and dramatic improved QoL. Before humira, not able to move or lift arms over head due to the pain in shoulder, walked like he was getting off a horse, major joints with swelling and changes in his nails---all resolved but when missed his dose (ran out all this started to come back in a few days). Mild symptoms few days before next dose due. Inflammatory back sx resolved. Some DDD lumbar spine but no radiculopathy and does not wish for PT. Snoring and wife thinks he has FAMILIA. Energy is fair. No s/sx DM. No dactylitis or tendonitis. Mild sx left 2nd MCP. No prednisone and only rare advil when due for humira (general LBP ache).     February 18, 2019  Last visit, elevated liver enzymes was to lose weight, avoid NSAID and alcohol and follow-up  With PCP which he did not do. He has gained weight. No asthma flares, nor any infections. Denies any fever, chills, SOB, ASHFORD, night sweats, or chest pain, or cough. Reports healthy  Up until a month ago (delay due to insurance, supplier side) was on adalimumab (humira) 40 mg injection every 2 weeks. He will restart this in a few days when this comes. Noticing more tendonitis in elbows, hand  pain with stiffness, cramping, right outer hand cramps. Intermittent arthralgia in ankles, chronic intermittent but no swelling. Intermittent dactylitis in fingers. EAS in hands. Shoulders are good. Inflammatory back symptoms now off adalimumab (humira), pain in SI joints, sides of hips bother. No redflags nor neuro signs or symptoms. Never any issues in knees, toes. All this but the intermittent ankle tender resolves on the adalimumab (humira). Has been taking ibuprofen 1000 mg every other day-tolerating no side-effects  x 2 weeks due to not being on the adalimumab (humira). Energy is low as not working out and gaining weight. The lack of adalimumab (humira) is affecting his arthritis making his job harder. Skin and nails are good. He knows he has a bad lower back, but feels better on the adalimumab (humira) but aware of the symptoms or signs to look for if not improves or changes. No other changes to PMSH       PMH:  Medical-Influenza B, Ashtma, PsA, psoriasis, lumbar DDD, elevated liver enzymes    Surgical-None   No blood transfusions  Injury-None     FH:  No autoimmune disorders, UC, crohn's, SLE, PsA, gout.  No MS or cancer in family  Mother-healthy  GM-DM   Father-HTN, CVIs, smoker   Maternal side Psoriatic, PsA, RA   Siblings-healthy  Children-3 --1 has asthma  FAMILIA in family      SH:  Social 5 week Alcohol. Smoking. No IVDU. +Chew  +Children. Left handed. . Exavacting worker     PMSH personally reviewed and updated by me.    ROS:  CONSTITUTIONAL: No fevers, night sweats or unintentional weight change. No acute distress, swollen glands  EYES: No vision change, diplopia, pain in eyes or red eyes   EARS, NOSE, MOUTH, THROAT: No tinnitus or hearing change, no epistaxis or nasal discharge, no oral lesions, throat clear. Normal saliva pool.  No drymouth.   CARDIOVASCULAR: No chest pain, palpitations, or pain with walking, no orthopnea or PND   RESPIRATORY: No dyspnea, cough, shortness of breath or wheezing.  "No pleurisy.   GI: No nausea, vomiting, diarrhea or constipation, no abdominal pain, or blood in stools.   : No change in urine, no dysuria or hematuria   MUSCKL: No enthesitis, plantar fascitis or heel pain. See above   INTEGUMENTARY: No concerning lesions or moles   NEURO: No loss of strength or sensation, no numbness or tingling, no tremor, no dizziness, no headache. No falls   ENDO: No polyuria or polydipsia, no temperature intolerance   HEME/LYMPH:No concerning bumps, bleeding problems, or swollen lymph nodes. No recent infections, hospitalizations or new illnesses.   Otherwise 14 point ROS obtained, reviewed and found negative.     Physical exam:  Vitals: Blood pressure 143/88, pulse 62, temperature 97.6  F (36.4  C), temperature source Oral, height 1.753 m (5' 9\"), weight 113 kg (249 lb 1.6 oz), SpO2 94 %.  Wt Readings from Last 4 Encounters:   02/18/19 113 kg (249 lb 1.6 oz)   04/25/18 110.2 kg (242 lb 14.4 oz)   11/21/17 106.6 kg (235 lb)   04/11/17 105.7 kg (233 lb)     Constitutional: WD-WN-WG cooperative  Eyes: nl EOM, PERRLA, vision, conjunctiva, sclera  ENT: nl external ears, nose, hearing, lips, teeth, gums, throat  Neck: no mass or thyroid enlargement  Resp: lungs clear to auscultation, nl to palpation, nl effort  CV: RRR, no murmurs, rubs or gallops, no edema  GI: no ABD mass or tenderness, no HSM  : not tested  Lymph: no cervical or epitrochlear nodes  MS: All TMJ, neck, shoulder, elbow, wrist, hand, spine, hip, knee, ankle, and foot joints were examined and otherwise found normal. Normal  strength. No active synovitis or deformity. Full ROM. Normal prayer sign. Negative Negative Lhermitte's sign. No periuncle erythema  Skin: no nail pitting, alopecia, rash  Neuro: nl cranial nerves, strength, sensation, DTRs.   Psych: nl judgement, orientation, memory, affect.      Labs/Imaging:  Results for orders placed or performed in visit on 04/25/18   Hepatitis B core antibody   Result Value Ref " Range    Hepatitis B Core Love Nonreactive NR^Nonreactive   Hepatitis B surface antigen   Result Value Ref Range    Hep B Surface Agn Nonreactive NR^Nonreactive   Hepatitis C antibody   Result Value Ref Range    Hepatitis C Antibody Nonreactive NR^Nonreactive   M Tuberculosis by Quantiferon   Result Value Ref Range    M Tuberculosis Result Negative NEG^Negative    M Tuberculosis Antigen Value 0.01 IU/mL   CBC with platelets differential   Result Value Ref Range    WBC 6.4 4.0 - 11.0 10e9/L    RBC Count 5.32 4.4 - 5.9 10e12/L    Hemoglobin 16.9 13.3 - 17.7 g/dL    Hematocrit 47.5 40.0 - 53.0 %    MCV 89 78 - 100 fl    MCH 31.8 26.5 - 33.0 pg    MCHC 35.6 31.5 - 36.5 g/dL    RDW 12.1 10.0 - 15.0 %    Platelet Count 220 150 - 450 10e9/L    Diff Method Automated Method     % Neutrophils 46.9 %    % Lymphocytes 40.4 %    % Monocytes 9.1 %    % Eosinophils 3.0 %    % Basophils 0.3 %    % Immature Granulocytes 0.3 %    Nucleated RBCs 0 0 /100    Absolute Neutrophil 3.0 1.6 - 8.3 10e9/L    Absolute Lymphocytes 2.6 0.8 - 5.3 10e9/L    Absolute Monocytes 0.6 0.0 - 1.3 10e9/L    Absolute Eosinophils 0.2 0.0 - 0.7 10e9/L    Absolute Basophils 0.0 0.0 - 0.2 10e9/L    Abs Immature Granulocytes 0.0 0 - 0.4 10e9/L    Absolute Nucleated RBC 0.0    AST   Result Value Ref Range    AST 47 (H) 0 - 45 U/L   ALT   Result Value Ref Range     (H) 0 - 70 U/L   Albumin level   Result Value Ref Range    Albumin 4.4 3.4 - 5.0 g/dL   Creatinine   Result Value Ref Range    Creatinine 1.20 0.66 - 1.25 mg/dL    GFR Estimate 68 >60 mL/min/1.7m2    GFR Estimate If Black 82 >60 mL/min/1.7m2   Cyclic Citrullinated Peptide Antibody IgG   Result Value Ref Range    Cyclic Citrullinated Peptide Antibody, IgG 1 <7 U/mL   Rheumatoid factor   Result Value Ref Range    Rheumatoid Factor <20 <20 IU/mL   TSH with free T4 reflex   Result Value Ref Range    TSH 1.07 0.40 - 4.00 mU/L   Hemoglobin A1c   Result Value Ref Range    Hemoglobin A1C 5.7 0 - 6.4 %    Routine UA with Micro Reflex to Culture   Result Value Ref Range    Color Urine Yellow     Appearance Urine Clear     Glucose Urine Negative NEG^Negative mg/dL    Bilirubin Urine Negative NEG^Negative    Ketones Urine Negative NEG^Negative mg/dL    Specific Gravity Urine 1.020 1.003 - 1.035    Blood Urine Negative NEG^Negative    pH Urine 6.0 5.0 - 7.0 pH    Protein Albumin Urine Negative NEG^Negative mg/dL    Urobilinogen mg/dL 0.0 0.0 - 2.0 mg/dL    Nitrite Urine Negative NEG^Negative    Leukocyte Esterase Urine Negative NEG^Negative    Source Midstream Urine     WBC Urine <1 0 - 5 /HPF    RBC Urine <1 0 - 2 /HPF   F Actin EIA with reflex   Result Value Ref Range    F-Actin Antibody IgG 8 0 - 19 Units   Mitochondrial M2 Antibody IgG   Result Value Ref Range    Mitochondrial M2 Antibody IgG 2 <4 U/mL       Impression/Plan:    1. Psoriatic arthritis, controlled on humira was off NSAID (now on since out of this due delay in getting). Exam shows inflammatoin, tendontis, enthesitis, some inflammatory back symptoms and arthralgias which improve on the medications. I am concerned of the use of this much ibuprofen, and he does have a risk of WISEMAN. +FH psoriasis, PsA, RA. Given significant response to humira, he and I agree this to be continued and there is a risk of another option not controlling his disesae or skin  Immunosuppression, check CBC, ALT, AST, ALb, creat.   2. Psoriasis, controlled on humira now  3. Fatigue, snoring, elevated liver function tests 4-2018. Needs complete annual physical with PCP, to include sleep study as he has sx suggestive of FAMILIA (declines referral). Exercise, weight loss and work on diet. Educated on risks associated with #1-2, ASCVD, WISEMAN and DM. I educated him on the new psoriatic arthritis  Guidelines, and need to work on weight loss and exercise. He has risk factors for metabolic syndrome, and needs complete physical    4. LBP, prior imaging DDD. No radiculopathy. Some inflammatory  back worse off adalimumab (humira), follow-up  with PCP when on adalimumab (humira) for 2 months for follow-up      Humira 40 mg SQ PEN every 14 days  Hold for infections  Recommend pneumonia 13 vaccine then wait 8 week to get pneumonia 23 vaccine. Declines today    RTC 6 month      The patient understood the rational for the diagnosis and treatment plan. Patient shared in the decision making. All questions were answered to best of my ability and the patient's satisfaction      GEORGIA Avitia CNP

## 2019-02-18 NOTE — PROGRESS NOTES
[facility] - Rheumatology Clinic Visit     Leon Samuels  is a 38 year old follow-up psoriatic arthritis, psoriasis. Humira since . Here with spouse. 4-2018 -RF -CCP - Factin - Mitochondrial  AST 47 -Hep B/c, MTB QG HGB A1C 5.7      Past: Methotrexate (not fully effective, not helped with skin, head fuzzy) and never started the leflunomide. Prednisone, dramatic response    Copy forward  Initial visit 4-2018: Taking humira 40 mg injection every 2 weeks, tolerating well. No infections and asthma is controlled. Reports gained like 30 lbs since starting the humira. Dramatic improvement in control of psoriasis, inflammation (shoulders, knees, ankles, hips and back) with no pain and dramatic improved QoL. Before humira, not able to move or lift arms over head due to the pain in shoulder, walked like he was getting off a horse, major joints with swelling and changes in his nails---all resolved but when missed his dose (ran out all this started to come back in a few days). Mild symptoms few days before next dose due. Inflammatory back sx resolved. Some DDD lumbar spine but no radiculopathy and does not wish for PT. Snoring and wife thinks he has FAMILIA. Energy is fair. No s/sx DM. No dactylitis or tendonitis. Mild sx left 2nd MCP. No prednisone and only rare advil when due for humira (general LBP ache).     February 18, 2019  Last visit, elevated liver enzymes was to lose weight, avoid NSAID and alcohol and follow-up  With PCP which he did not do. He has gained weight. No asthma flares, nor any infections. Denies any fever, chills, SOB, ASHFORD, night sweats, or chest pain, or cough. Reports healthy  Up until a month ago (delay due to insurance, supplier side) was on adalimumab (humira) 40 mg injection every 2 weeks. He will restart this in a few days when this comes. Noticing more tendonitis in elbows, hand pain with stiffness, cramping, right outer hand cramps. Intermittent arthralgia in  ankles, chronic intermittent but no swelling. Intermittent dactylitis in fingers. EAS in hands. Shoulders are good. Inflammatory back symptoms now off adalimumab (humira), pain in SI joints, sides of hips bother. No redflags nor neuro signs or symptoms. Never any issues in knees, toes. All this but the intermittent ankle tender resolves on the adalimumab (humira). Has been taking ibuprofen 1000 mg every other day-tolerating no side-effects  x 2 weeks due to not being on the adalimumab (humira). Energy is low as not working out and gaining weight. The lack of adalimumab (humira) is affecting his arthritis making his job harder. Skin and nails are good. He knows he has a bad lower back, but feels better on the adalimumab (humira) but aware of the symptoms or signs to look for if not improves or changes. No other changes to PMSH       PMH:  Medical-Influenza B, Ashtma, PsA, psoriasis, lumbar DDD, elevated liver enzymes    Surgical-None   No blood transfusions  Injury-None     FH:  No autoimmune disorders, UC, crohn's, SLE, PsA, gout.  No MS or cancer in family  Mother-healthy  GM-DM   Father-HTN, CVIs, smoker   Maternal side Psoriatic, PsA, RA   Siblings-healthy  Children-3 --1 has asthma  FAMILIA in family      SH:  Social 5 week Alcohol. Smoking. No IVDU. +Chew  +Children. Left handed. . Exavacting worker     PMSH personally reviewed and updated by me.    ROS:  CONSTITUTIONAL: No fevers, night sweats or unintentional weight change. No acute distress, swollen glands  EYES: No vision change, diplopia, pain in eyes or red eyes   EARS, NOSE, MOUTH, THROAT: No tinnitus or hearing change, no epistaxis or nasal discharge, no oral lesions, throat clear. Normal saliva pool.  No drymouth.   CARDIOVASCULAR: No chest pain, palpitations, or pain with walking, no orthopnea or PND   RESPIRATORY: No dyspnea, cough, shortness of breath or wheezing. No pleurisy.   GI: No nausea, vomiting, diarrhea or constipation, no abdominal  "pain, or blood in stools.   : No change in urine, no dysuria or hematuria   MUSCKL: No enthesitis, plantar fascitis or heel pain. See above   INTEGUMENTARY: No concerning lesions or moles   NEURO: No loss of strength or sensation, no numbness or tingling, no tremor, no dizziness, no headache. No falls   ENDO: No polyuria or polydipsia, no temperature intolerance   HEME/LYMPH:No concerning bumps, bleeding problems, or swollen lymph nodes. No recent infections, hospitalizations or new illnesses.   Otherwise 14 point ROS obtained, reviewed and found negative.     Physical exam:  Vitals: Blood pressure 143/88, pulse 62, temperature 97.6  F (36.4  C), temperature source Oral, height 1.753 m (5' 9\"), weight 113 kg (249 lb 1.6 oz), SpO2 94 %.  Wt Readings from Last 4 Encounters:   02/18/19 113 kg (249 lb 1.6 oz)   04/25/18 110.2 kg (242 lb 14.4 oz)   11/21/17 106.6 kg (235 lb)   04/11/17 105.7 kg (233 lb)     Constitutional: WD-WN-WG cooperative  Eyes: nl EOM, PERRLA, vision, conjunctiva, sclera  ENT: nl external ears, nose, hearing, lips, teeth, gums, throat  Neck: no mass or thyroid enlargement  Resp: lungs clear to auscultation, nl to palpation, nl effort  CV: RRR, no murmurs, rubs or gallops, no edema  GI: no ABD mass or tenderness, no HSM  : not tested  Lymph: no cervical or epitrochlear nodes  MS: All TMJ, neck, shoulder, elbow, wrist, hand, spine, hip, knee, ankle, and foot joints were examined and otherwise found normal. Normal  strength. No active synovitis or deformity. Full ROM. Normal prayer sign. Negative Negative Lhermitte's sign. No periuncle erythema  Skin: no nail pitting, alopecia, rash  Neuro: nl cranial nerves, strength, sensation, DTRs.   Psych: nl judgement, orientation, memory, affect.      Labs/Imaging:  Results for orders placed or performed in visit on 04/25/18   Hepatitis B core antibody   Result Value Ref Range    Hepatitis B Core Love Nonreactive NR^Nonreactive   Hepatitis B surface " antigen   Result Value Ref Range    Hep B Surface Agn Nonreactive NR^Nonreactive   Hepatitis C antibody   Result Value Ref Range    Hepatitis C Antibody Nonreactive NR^Nonreactive   M Tuberculosis by Quantiferon   Result Value Ref Range    M Tuberculosis Result Negative NEG^Negative    M Tuberculosis Antigen Value 0.01 IU/mL   CBC with platelets differential   Result Value Ref Range    WBC 6.4 4.0 - 11.0 10e9/L    RBC Count 5.32 4.4 - 5.9 10e12/L    Hemoglobin 16.9 13.3 - 17.7 g/dL    Hematocrit 47.5 40.0 - 53.0 %    MCV 89 78 - 100 fl    MCH 31.8 26.5 - 33.0 pg    MCHC 35.6 31.5 - 36.5 g/dL    RDW 12.1 10.0 - 15.0 %    Platelet Count 220 150 - 450 10e9/L    Diff Method Automated Method     % Neutrophils 46.9 %    % Lymphocytes 40.4 %    % Monocytes 9.1 %    % Eosinophils 3.0 %    % Basophils 0.3 %    % Immature Granulocytes 0.3 %    Nucleated RBCs 0 0 /100    Absolute Neutrophil 3.0 1.6 - 8.3 10e9/L    Absolute Lymphocytes 2.6 0.8 - 5.3 10e9/L    Absolute Monocytes 0.6 0.0 - 1.3 10e9/L    Absolute Eosinophils 0.2 0.0 - 0.7 10e9/L    Absolute Basophils 0.0 0.0 - 0.2 10e9/L    Abs Immature Granulocytes 0.0 0 - 0.4 10e9/L    Absolute Nucleated RBC 0.0    AST   Result Value Ref Range    AST 47 (H) 0 - 45 U/L   ALT   Result Value Ref Range     (H) 0 - 70 U/L   Albumin level   Result Value Ref Range    Albumin 4.4 3.4 - 5.0 g/dL   Creatinine   Result Value Ref Range    Creatinine 1.20 0.66 - 1.25 mg/dL    GFR Estimate 68 >60 mL/min/1.7m2    GFR Estimate If Black 82 >60 mL/min/1.7m2   Cyclic Citrullinated Peptide Antibody IgG   Result Value Ref Range    Cyclic Citrullinated Peptide Antibody, IgG 1 <7 U/mL   Rheumatoid factor   Result Value Ref Range    Rheumatoid Factor <20 <20 IU/mL   TSH with free T4 reflex   Result Value Ref Range    TSH 1.07 0.40 - 4.00 mU/L   Hemoglobin A1c   Result Value Ref Range    Hemoglobin A1C 5.7 0 - 6.4 %   Routine UA with Micro Reflex to Culture   Result Value Ref Range    Color Urine  Yellow     Appearance Urine Clear     Glucose Urine Negative NEG^Negative mg/dL    Bilirubin Urine Negative NEG^Negative    Ketones Urine Negative NEG^Negative mg/dL    Specific Gravity Urine 1.020 1.003 - 1.035    Blood Urine Negative NEG^Negative    pH Urine 6.0 5.0 - 7.0 pH    Protein Albumin Urine Negative NEG^Negative mg/dL    Urobilinogen mg/dL 0.0 0.0 - 2.0 mg/dL    Nitrite Urine Negative NEG^Negative    Leukocyte Esterase Urine Negative NEG^Negative    Source Midstream Urine     WBC Urine <1 0 - 5 /HPF    RBC Urine <1 0 - 2 /HPF   F Actin EIA with reflex   Result Value Ref Range    F-Actin Antibody IgG 8 0 - 19 Units   Mitochondrial M2 Antibody IgG   Result Value Ref Range    Mitochondrial M2 Antibody IgG 2 <4 U/mL       Impression/Plan:    1. Psoriatic arthritis, controlled on humira was off NSAID (now on since out of this due delay in getting). Exam shows inflammatoin, tendontis, enthesitis, some inflammatory back symptoms and arthralgias which improve on the medications. I am concerned of the use of this much ibuprofen, and he does have a risk of WISEMAN. +FH psoriasis, PsA, RA. Given significant response to humira, he and I agree this to be continued and there is a risk of another option not controlling his disesae or skin  Immunosuppression, check CBC, ALT, AST, ALb, creat.   2. Psoriasis, controlled on humira now  3. Fatigue, snoring, elevated liver function tests 4-2018. Needs complete annual physical with PCP, to include sleep study as he has sx suggestive of FAMILIA (declines referral). Exercise, weight loss and work on diet. Educated on risks associated with #1-2, ASCVD, WISEMAN and DM. I educated him on the new psoriatic arthritis  Guidelines, and need to work on weight loss and exercise. He has risk factors for metabolic syndrome, and needs complete physical    4. LBP, prior imaging DDD. No radiculopathy. Some inflammatory back worse off adalimumab (humira), follow-up  with PCP when on adalimumab (humira)  for 2 months for follow-up      Humira 40 mg SQ PEN every 14 days  Hold for infections  Recommend pneumonia 13 vaccine then wait 8 week to get pneumonia 23 vaccine. Declines today    RTC 6 month      The patient understood the rational for the diagnosis and treatment plan. Patient shared in the decision making. All questions were answered to best of my ability and the patient's satisfaction  Aniket HO, CNP, MSN  [facility]  Department of Rheumatology & Autoimmune Disorders

## 2019-08-12 ENCOUNTER — TELEPHONE (OUTPATIENT)
Dept: RHEUMATOLOGY | Facility: CLINIC | Age: 39
End: 2019-08-12

## 2019-08-12 NOTE — TELEPHONE ENCOUNTER
Patient contacted and reminded of upcoming appointment.  Patient confirmed they will be attending.  Patient instructed to bring updated medications list to appointment.    Aniket Gibson  EMT

## 2019-08-19 ENCOUNTER — OFFICE VISIT (OUTPATIENT)
Dept: RHEUMATOLOGY | Facility: CLINIC | Age: 39
End: 2019-08-19
Attending: NURSE PRACTITIONER
Payer: COMMERCIAL

## 2019-08-19 VITALS
HEART RATE: 79 BPM | DIASTOLIC BLOOD PRESSURE: 95 MMHG | OXYGEN SATURATION: 95 % | SYSTOLIC BLOOD PRESSURE: 144 MMHG | BODY MASS INDEX: 37.04 KG/M2 | WEIGHT: 250.8 LBS

## 2019-08-19 DIAGNOSIS — L40.50 PSORIATIC ARTHRITIS (H): ICD-10-CM

## 2019-08-19 DIAGNOSIS — I10 ESSENTIAL HYPERTENSION: ICD-10-CM

## 2019-08-19 DIAGNOSIS — D84.9 IMMUNOSUPPRESSION (H): ICD-10-CM

## 2019-08-19 DIAGNOSIS — R74.8 ELEVATED LIVER ENZYMES: ICD-10-CM

## 2019-08-19 DIAGNOSIS — L40.50 PSORIATIC ARTHRITIS (H): Primary | ICD-10-CM

## 2019-08-19 LAB
ALBUMIN SERPL-MCNC: 3.9 G/DL (ref 3.4–5)
ALT SERPL W P-5'-P-CCNC: 97 U/L (ref 0–70)
AST SERPL W P-5'-P-CCNC: 35 U/L (ref 0–45)
CREAT SERPL-MCNC: 1.11 MG/DL (ref 0.66–1.25)
ERYTHROCYTE [DISTWIDTH] IN BLOOD BY AUTOMATED COUNT: 12.3 % (ref 10–15)
GFR SERPL CREATININE-BSD FRML MDRD: 83 ML/MIN/{1.73_M2}
HCT VFR BLD AUTO: 47.9 % (ref 40–53)
HGB BLD-MCNC: 16.1 G/DL (ref 13.3–17.7)
MCH RBC QN AUTO: 32.1 PG (ref 26.5–33)
MCHC RBC AUTO-ENTMCNC: 33.6 G/DL (ref 31.5–36.5)
MCV RBC AUTO: 96 FL (ref 78–100)
PLATELET # BLD AUTO: 210 10E9/L (ref 150–450)
RBC # BLD AUTO: 5.01 10E12/L (ref 4.4–5.9)
WBC # BLD AUTO: 7 10E9/L (ref 4–11)

## 2019-08-19 PROCEDURE — 82040 ASSAY OF SERUM ALBUMIN: CPT | Performed by: NURSE PRACTITIONER

## 2019-08-19 PROCEDURE — 36415 COLL VENOUS BLD VENIPUNCTURE: CPT | Performed by: NURSE PRACTITIONER

## 2019-08-19 PROCEDURE — 85027 COMPLETE CBC AUTOMATED: CPT | Performed by: NURSE PRACTITIONER

## 2019-08-19 PROCEDURE — 82565 ASSAY OF CREATININE: CPT | Performed by: NURSE PRACTITIONER

## 2019-08-19 PROCEDURE — 84450 TRANSFERASE (AST) (SGOT): CPT | Performed by: NURSE PRACTITIONER

## 2019-08-19 PROCEDURE — 84460 ALANINE AMINO (ALT) (SGPT): CPT | Performed by: NURSE PRACTITIONER

## 2019-08-19 PROCEDURE — G0463 HOSPITAL OUTPT CLINIC VISIT: HCPCS | Mod: ZF

## 2019-08-19 ASSESSMENT — PAIN SCALES - GENERAL: PAINLEVEL: NO PAIN (0)

## 2019-08-19 NOTE — PATIENT INSTRUCTIONS
Schedule with primary care provider for hypertension or high blood pressure and should get annual physical

## 2019-08-19 NOTE — LETTER
8/19/2019      RE: Leon Samuels  [address]   [place] [address]       [facility] - Rheumatology Clinic Visit     Leon Samuels  is a 38 year old follow-up psoriatic arthritis, psoriasis. Humira since . Here with spouse. 4-2018 -RF -CCP - Factin - Mitochondrial  AST 47 -Hep B/c, MTB QG HGB A1C 5.7      Past: Methotrexate (not fully effective, not helped with skin, head fuzzy) and never started the leflunomide. Prednisone, dramatic response    Copy forward  Initial visit 4-2018: Taking humira 40 mg injection every 2 weeks, tolerating well. No infections and asthma is controlled. Reports gained like 30 lbs since starting the humira. Dramatic improvement in control of psoriasis, inflammation (shoulders, knees, ankles, hips and back) with no pain and dramatic improved QoL. Before humira, not able to move or lift arms over head due to the pain in shoulder, walked like he was getting off a horse, major joints with swelling and changes in his nails---all resolved but when missed his dose (ran out all this started to come back in a few days). Mild symptoms few days before next dose due. Inflammatory back sx resolved. Some DDD lumbar spine but no radiculopathy and does not wish for PT. Snoring and wife thinks he has FAMILIA. Energy is fair. No s/sx DM. No dactylitis or tendonitis. Mild sx left 2nd MCP. No prednisone and only rare advil when due for humira (general LBP ache).     Copy forward  February 18, 2019  Last visit, elevated liver enzymes was to lose weight, avoid NSAID and alcohol and follow-up  With PCP which he did not do. He has gained weight. No asthma flares, nor any infections. Denies any fever, chills, SOB, ASHFORD, night sweats, or chest pain, or cough. Reports healthy. Up until a month ago (delay due to insurance, supplier side) was on adalimumab (humira) 40 mg injection every 2 weeks. He will restart this in a few days when this comes. Noticing more tendonitis in  elbows, hand pain with stiffness, cramping, right outer hand cramps. Intermittent arthralgia in ankles, chronic intermittent but no swelling. Intermittent dactylitis in fingers. EAS in hands. Shoulders are good. Inflammatory back symptoms now off adalimumab (humira), pain in SI joints, sides of hips bother. No redflags nor neuro signs or symptoms. Never any issues in knees, toes. All this but the intermittent ankle tender resolves on the adalimumab (humira). Has been taking ibuprofen 1000 mg every other day-tolerating no side-effects  x 2 weeks due to not being on the adalimumab (humira). Energy is low as not working out and gaining weight. The lack of adalimumab (humira) is affecting his arthritis making his job harder. Skin and nails are good. He knows he has a bad lower back, but feels better on the adalimumab (humira) but aware of the symptoms or signs to look for if not improves or changes. No other changes to [facility]     August 19, 2019    Psoriatic arthritis and psoriasis is controlled. No hand or joint pain or tendonitis, no synovitis or dactylitis. No prednisone or NSAID use. No symptoms as he had in Feb 2019. No LBP. No longer taking ibuprofen. Skin is good. No flares. No EAS. No loss ROM and function is good. Gaining weight, does state he watches his diet and works out. Did have a salty supper so his BP is higher. 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 Which he says he will do. No headaches or neurological symptoms. Denies any fever, chills, SOB, ASHFORD, night sweats, or chest pain, or cough. Reports healthy. Continues adalimumab (humira) 40 mg injection every 14 days. Tolerates well no side-effects     PMH:  Medical-Influenza B, Ashtma, PsA, psoriasis, lumbar DDD, elevated liver enzymes    Surgical-None   No blood transfusions  Injury-None     FH:  No autoimmune disorders, UC, crohn's, SLE, PsA, gout.  No MS or cancer in family  Mother-healthy  GM-DM   Father-HTN, CVIs, smoker    Maternal side Psoriatic, PsA, RA   Siblings-healthy  Children-3 --1 has asthma  FAMILIA in family      SH:  Social 5 week Alcohol. Smoking. No IVDU. +Chew  +Children. Left handed. . Exavacting worker     [facility] personally reviewed and updated by me.    ROS:  CONSTITUTIONAL: No fevers, night sweats or unintentional weight change. No acute distress, swollen glands  EYES: No vision change, diplopia, pain in eyes or red eyes   EARS, NOSE, MOUTH, THROAT: No tinnitus or hearing change, no epistaxis or nasal discharge, no oral lesions, throat clear. Normal saliva pool.  No drymouth.   CARDIOVASCULAR: No chest pain, palpitations, or pain with walking, no orthopnea or PND   RESPIRATORY: No dyspnea, cough, shortness of breath or wheezing. No pleurisy.   GI: No nausea, vomiting, diarrhea or constipation, no abdominal pain, or blood in stools.   : No change in urine, no dysuria or hematuria   MUSCKL: No enthesitis, plantar fascitis or heel pain. See above   INTEGUMENTARY: No concerning lesions or moles   NEURO: No loss of strength or sensation, no numbness or tingling, no tremor, no dizziness, no headache. No falls   ENDO: No polyuria or polydipsia, no temperature intolerance   HEME/LYMPH:No concerning bumps, bleeding problems, or swollen lymph nodes. No recent infections, hospitalizations or new illnesses.   Otherwise 14 point ROS obtained, reviewed and found negative.     Physical exam:  Vitals: Blood pressure (!) 144/95, pulse 79, weight 113.8 kg (250 lb 12.8 oz), SpO2 95 %.  Wt Readings from Last 4 Encounters:   08/19/19 113.8 kg (250 lb 12.8 oz)   02/18/19 113 kg (249 lb 1.6 oz)   04/25/18 110.2 kg (242 lb 14.4 oz)   11/21/17 106.6 kg (235 lb)     Constitutional: WD-WN-WG cooperative  Eyes: nl EOM, PERRLA, vision, conjunctiva, sclera  ENT: nl external ears, nose, hearing, lips, teeth, gums, throat  Neck: no mass or thyroid enlargement  Resp: lungs clear to auscultation, nl to palpation, nl effort  CV: RRR, no murmurs,  rubs or gallops, no edema  GI: no ABD mass or tenderness, no HSM  : not tested  Lymph: no cervical or epitrochlear nodes  MS: left anterior dime size cyst mobile. All TMJ, neck, shoulder, elbow, wrist, hand, spine, hip, knee, ankle, and foot joints were examined and otherwise found normal. Normal  strength. No active synovitis or deformity. Full ROM. Normal prayer sign. Negative Lhermitte's sign. No periuncle erythema  Skin: no nail pitting, alopecia, rash  Neuro: nl cranial nerves, strength, sensation, DTRs.   Psych: nl judgement, orientation, memory, affect.      Labs/Imaging:  Results for orders placed or performed in visit on 02/18/19   CRP inflammation   Result Value Ref Range    CRP Inflammation <2.9 0.0 - 8.0 mg/L   Creatinine   Result Value Ref Range    Creatinine 1.20 0.66 - 1.25 mg/dL    GFR Estimate 76 >60 mL/min/[1.73_m2]    GFR Estimate If Black 88 >60 mL/min/[1.73_m2]   Albumin level   Result Value Ref Range    Albumin 4.0 3.4 - 5.0 g/dL   ALT   Result Value Ref Range     (H) 0 - 70 U/L   AST   Result Value Ref Range    AST 43 0 - 45 U/L   CBC with platelets   Result Value Ref Range    WBC 5.8 4.0 - 11.0 10e9/L    RBC Count 5.37 4.4 - 5.9 10e12/L    Hemoglobin 16.6 13.3 - 17.7 g/dL    Hematocrit 49.9 40.0 - 53.0 %    MCV 93 78 - 100 fl    MCH 30.9 26.5 - 33.0 pg    MCHC 33.3 31.5 - 36.5 g/dL    RDW 12.3 10.0 - 15.0 %    Platelet Count 240 150 - 450 10e9/L       Impression/Plan:    1. Psoriatic arthritis, controlled on humira was off NSAID (now on since out of this due delay in getting). Exam negative. +FH psoriasis, PsA, RA. Given significant response to humira, he and I agree this to be continued and there is a risk of another option not controlling his disesae or skin  Immunosuppression, check CBC, ALT, AST, ALb, creat.   2. Psoriasis, controlled on humira now  3. Fatigue, snoring, elevated liver function tests 2-2019. Needs complete annual physical with PCP, to include sleep study as he  has sx suggestive of FAMILIA (declines referral). Exercise, weight loss and work on diet. Educated on risks associated with #1-2, ASCVD, WISEMAN and DM. I educated him on the new psoriatic arthritis  Guidelines, and need to work on weight loss and exercise. He has risk factors for metabolic syndrome, WISEMAN, and needs complete physical    4. LBP, prior imaging DDD. No radiculopathy. Some inflammatory back worse off adalimumab (humira), follow-up  with PCP when on adalimumab (humira) for 2 months for follow-up . Resolved now     Humira 40 mg SQ PEN every 14 days  Hold for infections  Recommend pneumonia 13 vaccine then wait 8 week to get pneumonia 23 vaccine. Declines today    RTC 6 month      The patient understood the rational for the diagnosis and treatment plan. Patient shared in the decision making. All questions were answered to best of my ability and the patient's satisfaction  Aniket HO, CNP, MSN  [facility]  Department of Rheumatology & Autoimmune Disorders

## 2019-08-19 NOTE — RESULT ENCOUNTER NOTE
The blood counts, liver, kidney and CRP inflammation labs are normal. But elevated ALT 97 recommend follow-up  With PCP, risk of WISEMAN and fatty liver     Aniket HO CNP, MSN  8/19/2019  8:21 AM

## 2019-08-19 NOTE — PROGRESS NOTES
[facility] - Rheumatology Clinic Visit     Leon Samuels  is a 38 year old follow-up psoriatic arthritis, psoriasis. Humira since . Here with spouse. 4-2018 -RF -CCP - Factin - Mitochondrial  AST 47 -Hep B/c, MTB QG HGB A1C 5.7      Past: Methotrexate (not fully effective, not helped with skin, head fuzzy) and never started the leflunomide. Prednisone, dramatic response    Copy forward  Initial visit 4-2018: Taking humira 40 mg injection every 2 weeks, tolerating well. No infections and asthma is controlled. Reports gained like 30 lbs since starting the humira. Dramatic improvement in control of psoriasis, inflammation (shoulders, knees, ankles, hips and back) with no pain and dramatic improved QoL. Before humira, not able to move or lift arms over head due to the pain in shoulder, walked like he was getting off a horse, major joints with swelling and changes in his nails---all resolved but when missed his dose (ran out all this started to come back in a few days). Mild symptoms few days before next dose due. Inflammatory back sx resolved. Some DDD lumbar spine but no radiculopathy and does not wish for PT. Snoring and wife thinks he has FAMILIA. Energy is fair. No s/sx DM. No dactylitis or tendonitis. Mild sx left 2nd MCP. No prednisone and only rare advil when due for humira (general LBP ache).     Copy forward  February 18, 2019  Last visit, elevated liver enzymes was to lose weight, avoid NSAID and alcohol and follow-up  With PCP which he did not do. He has gained weight. No asthma flares, nor any infections. Denies any fever, chills, SOB, ASHFORD, night sweats, or chest pain, or cough. Reports healthy. Up until a month ago (delay due to insurance, supplier side) was on adalimumab (humira) 40 mg injection every 2 weeks. He will restart this in a few days when this comes. Noticing more tendonitis in elbows, hand pain with stiffness, cramping, right outer hand cramps. Intermittent  arthralgia in ankles, chronic intermittent but no swelling. Intermittent dactylitis in fingers. EAS in hands. Shoulders are good. Inflammatory back symptoms now off adalimumab (humira), pain in SI joints, sides of hips bother. No redflags nor neuro signs or symptoms. Never any issues in knees, toes. All this but the intermittent ankle tender resolves on the adalimumab (humira). Has been taking ibuprofen 1000 mg every other day-tolerating no side-effects  x 2 weeks due to not being on the adalimumab (humira). Energy is low as not working out and gaining weight. The lack of adalimumab (humira) is affecting his arthritis making his job harder. Skin and nails are good. He knows he has a bad lower back, but feels better on the adalimumab (humira) but aware of the symptoms or signs to look for if not improves or changes. No other changes to PMSH     August 19, 2019    Psoriatic arthritis and psoriasis is controlled. No hand or joint pain or tendonitis, no synovitis or dactylitis. No prednisone or NSAID use. No symptoms as he had in Feb 2019. No LBP. No longer taking ibuprofen. Skin is good. No flares. No EAS. No loss ROM and function is good. Gaining weight, does state he watches his diet and works out. Did have a salty supper so his BP is higher. 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 Which he says he will do. No headaches or neurological symptoms. Denies any fever, chills, SOB, ASHFORD, night sweats, or chest pain, or cough. Reports healthy. Continues adalimumab (humira) 40 mg injection every 14 days. Tolerates well no side-effects     PMH:  Medical-Influenza B, Ashtma, PsA, psoriasis, lumbar DDD, elevated liver enzymes    Surgical-None   No blood transfusions  Injury-None     FH:  No autoimmune disorders, UC, crohn's, SLE, PsA, gout.  No MS or cancer in family  Mother-healthy  GM-DM   Father-HTN, CVIs, smoker   Maternal side Psoriatic, PsA, RA   Siblings-healthy  Children-3 --1 has  asthma  FAMILIA in family      SH:  Social 5 week Alcohol. Smoking. No IVDU. +Chew  +Children. Left handed. . Exavacting worker     [facility] personally reviewed and updated by me.    ROS:  CONSTITUTIONAL: No fevers, night sweats or unintentional weight change. No acute distress, swollen glands  EYES: No vision change, diplopia, pain in eyes or red eyes   EARS, NOSE, MOUTH, THROAT: No tinnitus or hearing change, no epistaxis or nasal discharge, no oral lesions, throat clear. Normal saliva pool.  No drymouth.   CARDIOVASCULAR: No chest pain, palpitations, or pain with walking, no orthopnea or PND   RESPIRATORY: No dyspnea, cough, shortness of breath or wheezing. No pleurisy.   GI: No nausea, vomiting, diarrhea or constipation, no abdominal pain, or blood in stools.   : No change in urine, no dysuria or hematuria   MUSCKL: No enthesitis, plantar fascitis or heel pain. See above   INTEGUMENTARY: No concerning lesions or moles   NEURO: No loss of strength or sensation, no numbness or tingling, no tremor, no dizziness, no headache. No falls   ENDO: No polyuria or polydipsia, no temperature intolerance   HEME/LYMPH:No concerning bumps, bleeding problems, or swollen lymph nodes. No recent infections, hospitalizations or new illnesses.   Otherwise 14 point ROS obtained, reviewed and found negative.     Physical exam:  Vitals: Blood pressure (!) 144/95, pulse 79, weight 113.8 kg (250 lb 12.8 oz), SpO2 95 %.  Wt Readings from Last 4 Encounters:   08/19/19 113.8 kg (250 lb 12.8 oz)   02/18/19 113 kg (249 lb 1.6 oz)   04/25/18 110.2 kg (242 lb 14.4 oz)   11/21/17 106.6 kg (235 lb)     Constitutional: WD-WN-WG cooperative  Eyes: nl EOM, PERRLA, vision, conjunctiva, sclera  ENT: nl external ears, nose, hearing, lips, teeth, gums, throat  Neck: no mass or thyroid enlargement  Resp: lungs clear to auscultation, nl to palpation, nl effort  CV: RRR, no murmurs, rubs or gallops, no edema  GI: no ABD mass or tenderness, no HSM  : not  tested  Lymph: no cervical or epitrochlear nodes  MS: left anterior dime size cyst mobile. All TMJ, neck, shoulder, elbow, wrist, hand, spine, hip, knee, ankle, and foot joints were examined and otherwise found normal. Normal  strength. No active synovitis or deformity. Full ROM. Normal prayer sign. Negative Lhermitte's sign. No periuncle erythema  Skin: no nail pitting, alopecia, rash  Neuro: nl cranial nerves, strength, sensation, DTRs.   Psych: nl judgement, orientation, memory, affect.      Labs/Imaging:  Results for orders placed or performed in visit on 02/18/19   CRP inflammation   Result Value Ref Range    CRP Inflammation <2.9 0.0 - 8.0 mg/L   Creatinine   Result Value Ref Range    Creatinine 1.20 0.66 - 1.25 mg/dL    GFR Estimate 76 >60 mL/min/[1.73_m2]    GFR Estimate If Black 88 >60 mL/min/[1.73_m2]   Albumin level   Result Value Ref Range    Albumin 4.0 3.4 - 5.0 g/dL   ALT   Result Value Ref Range     (H) 0 - 70 U/L   AST   Result Value Ref Range    AST 43 0 - 45 U/L   CBC with platelets   Result Value Ref Range    WBC 5.8 4.0 - 11.0 10e9/L    RBC Count 5.37 4.4 - 5.9 10e12/L    Hemoglobin 16.6 13.3 - 17.7 g/dL    Hematocrit 49.9 40.0 - 53.0 %    MCV 93 78 - 100 fl    MCH 30.9 26.5 - 33.0 pg    MCHC 33.3 31.5 - 36.5 g/dL    RDW 12.3 10.0 - 15.0 %    Platelet Count 240 150 - 450 10e9/L       Impression/Plan:    1. Psoriatic arthritis, controlled on humira was off NSAID (now on since out of this due delay in getting). Exam negative. +FH psoriasis, PsA, RA. Given significant response to humira, he and I agree this to be continued and there is a risk of another option not controlling his disesae or skin  Immunosuppression, check CBC, ALT, AST, ALb, creat.   2. Psoriasis, controlled on humira now  3. Fatigue, snoring, elevated liver function tests 2-2019. Needs complete annual physical with PCP, to include sleep study as he has sx suggestive of FAMILIA (declines referral). Exercise, weight loss and  work on diet. Educated on risks associated with #1-2, ASCVD, WISEMAN and DM. I educated him on the new psoriatic arthritis  Guidelines, and need to work on weight loss and exercise. He has risk factors for metabolic syndrome, WISEMAN, and needs complete physical    4. LBP, prior imaging DDD. No radiculopathy. Some inflammatory back worse off adalimumab (humira), follow-up  with PCP when on adalimumab (humira) for 2 months for follow-up . Resolved now     Humira 40 mg SQ PEN every 14 days  Hold for infections  Recommend pneumonia 13 vaccine then wait 8 week to get pneumonia 23 vaccine. Declines today    RTC 6 month      The patient understood the rational for the diagnosis and treatment plan. Patient shared in the decision making. All questions were answered to best of my ability and the patient's satisfaction  Aniket HO, CNP, MSN  [facility]  Department of Rheumatology & Autoimmune Disorders

## 2019-08-19 NOTE — NURSING NOTE
"Chief Complaint   Patient presents with     RECHECK     Psoriatic arthritis      Vital signs:      BP: (!) 141/101 Pulse: 79     SpO2: 95 %       Weight: 113.8 kg (250 lb 12.8 oz)  Estimated body mass index is 37.04 kg/m  as calculated from the following:    Height as of 2/18/19: 1.753 m (5' 9\").    Weight as of this encounter: 113.8 kg (250 lb 12.8 oz).      Suzan Irizarry    "

## 2019-09-29 ENCOUNTER — HEALTH MAINTENANCE LETTER (OUTPATIENT)
Age: 39
End: 2019-09-29

## 2020-01-25 ENCOUNTER — OFFICE VISIT (OUTPATIENT)
Dept: URGENT CARE | Facility: URGENT CARE | Age: 40
End: 2020-01-25
Payer: COMMERCIAL

## 2020-01-25 VITALS
TEMPERATURE: 98.8 F | HEART RATE: 93 BPM | DIASTOLIC BLOOD PRESSURE: 91 MMHG | WEIGHT: 251.9 LBS | OXYGEN SATURATION: 96 % | SYSTOLIC BLOOD PRESSURE: 154 MMHG | BODY MASS INDEX: 37.2 KG/M2

## 2020-01-25 DIAGNOSIS — J45.31 MILD PERSISTENT ASTHMA WITH ACUTE EXACERBATION: ICD-10-CM

## 2020-01-25 DIAGNOSIS — D84.9 IMMUNOSUPPRESSION (H): ICD-10-CM

## 2020-01-25 DIAGNOSIS — J20.9 ACUTE BRONCHITIS WITH COEXISTING CONDITION REQUIRING PROPHYLACTIC TREATMENT: Primary | ICD-10-CM

## 2020-01-25 PROCEDURE — 99214 OFFICE O/P EST MOD 30 MIN: CPT | Performed by: PHYSICIAN ASSISTANT

## 2020-01-25 RX ORDER — DEXAMETHASONE 4 MG/1
2 TABLET ORAL 2 TIMES DAILY
Qty: 12 G | Refills: 0 | Status: SHIPPED | OUTPATIENT
Start: 2020-01-25 | End: 2024-01-26

## 2020-01-25 RX ORDER — DEXAMETHASONE 4 MG/1
TABLET ORAL
COMMUNITY
Start: 2020-01-21 | End: 2020-01-25

## 2020-01-25 RX ORDER — DOXYCYCLINE HYCLATE 100 MG
100 TABLET ORAL 2 TIMES DAILY
Qty: 14 TABLET | Refills: 0 | Status: SHIPPED | OUTPATIENT
Start: 2020-01-25 | End: 2023-07-13

## 2020-01-25 ASSESSMENT — ENCOUNTER SYMPTOMS
FEVER: 0
ABDOMINAL PAIN: 0
CHILLS: 0
SINUS PAIN: 0
SHORTNESS OF BREATH: 0
VOMITING: 0
NAUSEA: 0
FOCAL WEAKNESS: 0
DIARRHEA: 0
WHEEZING: 1
COUGH: 1
HEADACHES: 0

## 2020-01-25 NOTE — PROGRESS NOTES
HPI  January 25, 2020    HPI: Leon Samuels is a 39 year old male with hx asthma & psoriatic arthritis who complains of moderate productive cough, wheezing, and chest tightness onset 6 weeks ago. Pt states he went to [place] last week and felt better, but when he returned 5 days ago symptoms returned. Symptoms are constant in duration. Denies fever/chills, sinus pressure, HA, CP, SOB, abd pain, N/V/D, rash, or any other symptoms. Patient denies sick contacts.    Pt takes Humira for his arthritis. When he initially got sick, he skipped a dose of this. When he was feeling better last week he resumed it.   He uses Flovent and albuterol PRN for his asthma. He is currently out of his Flovent and has been for a couple of weeks.    Past Medical History:   Diagnosis Date     Arthritis      Unspecified asthma(493.90)      No past surgical history on file.  Social History     Tobacco Use     Smoking status: Never Smoker     Smokeless tobacco: Former User     Types: Chew   Substance Use Topics     Alcohol use: Yes     Comment: once per week  (2 drinks at most per week)     Drug use: No     Patient Active Problem List   Diagnosis     DDD (degenerative disc disease), lumbar     Displacement of intervertebral disc of lumbosacral region     Lumbosacral radiculopathy     Obesity     Mild persistent asthma without complication     Psoriatic arthritis (H)     Psoriasis     Immunosuppression (H)     No family history on file.     Problem list, Medication list, Allergies, and Medical/Social/Surgical histories reviewed in [facility] and updated as appropriate.      Review of Systems   Constitutional: Negative for chills and fever.   HENT: Negative for sinus pain.    Respiratory: Positive for cough and wheezing. Negative for shortness of breath.    Cardiovascular: Negative for chest pain.   Gastrointestinal: Negative for abdominal pain, diarrhea, nausea and vomiting.   Skin: Negative for rash.   Neurological: Negative for focal weakness and  headaches.   All other systems reviewed and are negative.        Physical Exam  Vitals signs and nursing note reviewed.   HENT:      Head: Normocephalic and atraumatic.      Right Ear: Tympanic membrane and external ear normal.      Left Ear: Tympanic membrane and external ear normal.      Nose: Nose normal.      Mouth/Throat:      Mouth: Mucous membranes are moist.      Pharynx: Oropharynx is clear.   Cardiovascular:      Rate and Rhythm: Normal rate and regular rhythm.      Heart sounds: Normal heart sounds.   Pulmonary:      Effort: Pulmonary effort is normal.      Breath sounds: Wheezing (expiratory, in lung bases) present.   Musculoskeletal: Normal range of motion.   Skin:     General: Skin is warm and dry.   Neurological:      Mental Status: He is alert and oriented to person, place, and time.       Vital Signs  BP (!) 154/91 (BP Location: Right arm, Patient Position: Sitting, Cuff Size: Adult Large)   Pulse 93   Temp 98.8  F (37.1  C) (Oral)   Wt 114.3 kg (251 lb 14.4 oz)   SpO2 96%   BMI 37.20 kg/m       Diagnostic Test Results:  none     ASSESSMENT/PLAN      ICD-10-CM    1. Acute bronchitis with coexisting condition requiring prophylactic treatment J20.9 doxycycline hyclate (VIBRA-TABS) 100 MG tablet   2. Immunosuppression (H) D89.9    3. Mild persistent asthma with acute exacerbation J45.31 FLOVENT  MCG/ACT inhaler      Mild wheezing in lung bases, no resp distress. Suspect bronchitis & will cover for bacterial etiology due to immunosuppression & double sickening.   Will also refill Flovent. Continue albuterol PRN.      I have discussed any lab or imaging results, the patient's diagnosis, and my plan of treatment with the patient and/or family. Patient is aware to come back in if with worsening symptoms or if no relief despite treatment plan.  Patient voiced understanding and had no further questions.       Follow Up: Return in about 1 week (around 2/1/2020) for Follow up w/ primary care  provider if not better.    TOBIAS Jarvis, PA-C  [facility]

## 2020-02-10 ENCOUNTER — TELEPHONE (OUTPATIENT)
Dept: RHEUMATOLOGY | Facility: CLINIC | Age: 40
End: 2020-02-10

## 2020-02-17 ENCOUNTER — OFFICE VISIT (OUTPATIENT)
Dept: RHEUMATOLOGY | Facility: CLINIC | Age: 40
End: 2020-02-17
Attending: NURSE PRACTITIONER
Payer: COMMERCIAL

## 2020-02-17 VITALS
SYSTOLIC BLOOD PRESSURE: 135 MMHG | BODY MASS INDEX: 38.42 KG/M2 | OXYGEN SATURATION: 93 % | HEART RATE: 81 BPM | TEMPERATURE: 97.6 F | WEIGHT: 260.2 LBS | DIASTOLIC BLOOD PRESSURE: 97 MMHG

## 2020-02-17 DIAGNOSIS — L40.50 PSORIATIC ARTHRITIS (H): ICD-10-CM

## 2020-02-17 DIAGNOSIS — Z11.1 SCREENING FOR TUBERCULOSIS: ICD-10-CM

## 2020-02-17 DIAGNOSIS — D84.9 IMMUNOSUPPRESSION (H): ICD-10-CM

## 2020-02-17 DIAGNOSIS — L40.9 PSORIASIS: ICD-10-CM

## 2020-02-17 DIAGNOSIS — R74.8 ELEVATED LIVER ENZYMES: ICD-10-CM

## 2020-02-17 DIAGNOSIS — L40.50 PSORIATIC ARTHRITIS (H): Primary | ICD-10-CM

## 2020-02-17 LAB
ALBUMIN SERPL-MCNC: 3.7 G/DL (ref 3.4–5)
ALT SERPL W P-5'-P-CCNC: 84 U/L (ref 0–70)
AST SERPL W P-5'-P-CCNC: 32 U/L (ref 0–45)
CREAT SERPL-MCNC: 1.15 MG/DL (ref 0.66–1.25)
ERYTHROCYTE [DISTWIDTH] IN BLOOD BY AUTOMATED COUNT: 12.8 % (ref 10–15)
GFR SERPL CREATININE-BSD FRML MDRD: 80 ML/MIN/{1.73_M2}
HCT VFR BLD AUTO: 45.5 % (ref 40–53)
HGB BLD-MCNC: 15.5 G/DL (ref 13.3–17.7)
MCH RBC QN AUTO: 32 PG (ref 26.5–33)
MCHC RBC AUTO-ENTMCNC: 34.1 G/DL (ref 31.5–36.5)
MCV RBC AUTO: 94 FL (ref 78–100)
PLATELET # BLD AUTO: 222 10E9/L (ref 150–450)
RBC # BLD AUTO: 4.84 10E12/L (ref 4.4–5.9)
WBC # BLD AUTO: 8.4 10E9/L (ref 4–11)

## 2020-02-17 PROCEDURE — G0463 HOSPITAL OUTPT CLINIC VISIT: HCPCS | Mod: ZF

## 2020-02-17 PROCEDURE — 36415 COLL VENOUS BLD VENIPUNCTURE: CPT | Performed by: NURSE PRACTITIONER

## 2020-02-17 PROCEDURE — 84460 ALANINE AMINO (ALT) (SGPT): CPT | Performed by: NURSE PRACTITIONER

## 2020-02-17 PROCEDURE — 86481 TB AG RESPONSE T-CELL SUSP: CPT | Performed by: NURSE PRACTITIONER

## 2020-02-17 PROCEDURE — 85027 COMPLETE CBC AUTOMATED: CPT | Performed by: NURSE PRACTITIONER

## 2020-02-17 PROCEDURE — 82040 ASSAY OF SERUM ALBUMIN: CPT | Performed by: NURSE PRACTITIONER

## 2020-02-17 PROCEDURE — 82565 ASSAY OF CREATININE: CPT | Performed by: NURSE PRACTITIONER

## 2020-02-17 PROCEDURE — 84450 TRANSFERASE (AST) (SGOT): CPT | Performed by: NURSE PRACTITIONER

## 2020-02-17 ASSESSMENT — PAIN SCALES - GENERAL: PAINLEVEL: NO PAIN (0)

## 2020-02-17 NOTE — LETTER
2/17/2020      RE: Leon Samuels  [id number] [place] [address]       [facility] - Rheumatology Clinic Visit     Leon Samuels  is a 39 year old follow-up psoriatic arthritis, psoriasis. Humira since . Here with spouse. 4-2018 -RF -CCP - Factin - Mitochondrial  AST 47 -Hep B/c, MTB QG. Past Dr. Moncada      Past: Methotrexate (not fully effective, not helped with skin, head fuzzy) and never started the leflunomide. Prednisone, dramatic response    Copy forward  Initial visit 4-2018: Taking humira 40 mg injection every 2 weeks, tolerating well. No infections and asthma is controlled. Reports gained like 30 lbs since starting the humira. Dramatic improvement in control of psoriasis, inflammation (shoulders, knees, ankles, hips and back) with no pain and dramatic improved QoL. Before humira, not able to move or lift arms over head due to the pain in shoulder, walked like he was getting off a horse, major joints with swelling and changes in his nails---all resolved but when missed his dose (ran out all this started to come back in a few days). Mild symptoms few days before next dose due. Inflammatory back sx resolved. Some DDD lumbar spine but no radiculopathy and does not wish for PT. Snoring and wife thinks he has FAMILIA. Energy is fair. No s/sx DM. No dactylitis or tendonitis. Mild sx left 2nd MCP. No prednisone and only rare advil when due for humira (general LBP ache).     Copy forward  February 18, 2019  Last visit, elevated liver enzymes was to lose weight, avoid NSAID and alcohol and follow-up  With PCP which he did not do. He has gained weight. No asthma flares, nor any infections. Denies any fever, chills, SOB, ASHFORD, night sweats, or chest pain, or cough. Reports healthy. Up until a month ago (delay due to insurance, supplier side) was on adalimumab (humira) 40 mg injection every 2 weeks. He will restart this in a few days when this comes. Noticing more  tendonitis in elbows, hand pain with stiffness, cramping, right outer hand cramps. Intermittent arthralgia in ankles, chronic intermittent but no swelling. Intermittent dactylitis in fingers. EAS in hands. Shoulders are good. Inflammatory back symptoms now off adalimumab (humira), pain in SI joints, sides of hips bother. No redflags nor neuro signs or symptoms. Never any issues in knees, toes. All this but the intermittent ankle tender resolves on the adalimumab (humira). Has been taking ibuprofen 1000 mg every other day-tolerating no side-effects  x 2 weeks due to not being on the adalimumab (humira). Energy is low as not working out and gaining weight. The lack of adalimumab (humira) is affecting his arthritis making his job harder. Skin and nails are good. He knows he has a bad lower back, but feels better on the adalimumab (humira) but aware of the symptoms or signs to look for if not improves or changes. No other changes to PMSH     February 17, 2020  Last seen 8-2019-no changes, recommended follow-up PCP with elevated ALT 97    Bronchitis with asthma on 1-25. Reports cough for about 6 weeks, recent prednisone is helping. Doxy only mild help. Denies any fever, chills, SOB, ASHFORD, night sweats, or chest pain, or cough. Reports healthy. Finishing prednisone 20 mg day x 5 days.     Psoriatic arthritis and psoriasis is controlled. He did takes adalimumab (humira) every 3 week when was having illness symptoms. Some stiffness in hands, hips, PIPS and lower back; some psoriasis on face and scalp with this reduction.      Continues adalimumab (humira) 40 mg injection every 14 days. Tolerates well no side-effects     PMH:  Medical-Influenza B, Ashtma, PsA, psoriasis, lumbar DDD, elevated liver enzymes    Surgical-None   No blood transfusions  Injury-None     FH:  No autoimmune disorders, UC, crohn's, SLE, PsA, gout.  No MS or cancer in family  Mother-healthy  GM-DM   Father-HTN, CVIs, smoker   Maternal side Psoriatic,  PsA, RA   Siblings-healthy  Children-3 --1 has asthma  FAMILIA in family      SH:  Social 5 week Alcohol. Smoking. No IVDU. +Chew  +Children. Left handed. . Exavacting worker     [facility] personally reviewed and updated by me.    ROS:  CONSTITUTIONAL: No fevers, night sweats or unintentional weight change. No acute distress, swollen glands  EYES: No vision change, diplopia, pain in eyes or red eyes   EARS, NOSE, MOUTH, THROAT: No tinnitus or hearing change, no epistaxis or nasal discharge, no oral lesions, throat clear. Normal saliva pool.  No drymouth.   CARDIOVASCULAR: No chest pain, palpitations, or pain with walking, no orthopnea or PND   RESPIRATORY: No dyspnea, cough, shortness of breath or wheezing. No pleurisy.   GI: No nausea, vomiting, diarrhea or constipation, no abdominal pain, or blood in stools.   : No change in urine, no dysuria or hematuria   MUSCKL: No enthesitis, plantar fascitis or heel pain. See above   INTEGUMENTARY: No concerning lesions or moles   NEURO: No loss of strength or sensation, no numbness or tingling, no tremor, no dizziness, no headache. No falls   ENDO: No polyuria or polydipsia, no temperature intolerance   HEME/LYMPH:No concerning bumps, bleeding problems, or swollen lymph nodes. No recent infections, hospitalizations or new illnesses.   Otherwise 14 point ROS obtained, reviewed and found negative.     Physical exam:  Vitals: Blood pressure (!) 135/97, pulse 81, temperature 97.6  F (36.4  C), weight 118 kg (260 lb 3.2 oz), SpO2 93 %.  Wt Readings from Last 4 Encounters:   02/17/20 118 kg (260 lb 3.2 oz)   01/25/20 114.3 kg (251 lb 14.4 oz)   08/19/19 113.8 kg (250 lb 12.8 oz)   02/18/19 113 kg (249 lb 1.6 oz)     Constitutional: WD-WN-WG cooperative  Eyes: nl EOM, PERRLA, vision, conjunctiva, sclera  ENT: nl external ears, nose, hearing, lips, teeth, gums, throat  Neck: no mass or thyroid enlargement  Resp: lungs clear to auscultation, nl to palpation, nl effort  CV: RRR, no  murmurs, rubs or gallops, no edema  GI: no ABD mass or tenderness, no HSM  : not tested  Lymph: no cervical or epitrochlear nodes  MS: sides of hips sore. All TMJ, neck, shoulder, elbow, wrist, hand, spine, hip, knee, ankle, and foot joints were examined and otherwise found normal. Normal  strength. No active synovitis or deformity. Full ROM. Normal prayer sign. Negative Lhermitte's sign. No periuncle erythema  Skin: no nail pitting, alopecia, rash +psoriasis on face and scalp  Neuro: nl cranial nerves, strength, sensation, DTRs.   Psych: nl judgement, orientation, memory, affect.      Labs/Imaging:  Results for orders placed or performed in visit on 02/17/20   Creatinine     Status: None   Result Value Ref Range    Creatinine 1.15 0.66 - 1.25 mg/dL    GFR Estimate 80 >60 mL/min/[1.73_m2]    GFR Estimate If Black >90 >60 mL/min/[1.73_m2]   Albumin level     Status: None   Result Value Ref Range    Albumin 3.7 3.4 - 5.0 g/dL   ALT     Status: Abnormal   Result Value Ref Range    ALT 84 (H) 0 - 70 U/L   AST     Status: None   Result Value Ref Range    AST 32 0 - 45 U/L   CBC with platelets     Status: None   Result Value Ref Range    WBC 8.4 4.0 - 11.0 10e9/L    RBC Count 4.84 4.4 - 5.9 10e12/L    Hemoglobin 15.5 13.3 - 17.7 g/dL    Hematocrit 45.5 40.0 - 53.0 %    MCV 94 78 - 100 fl    MCH 32.0 26.5 - 33.0 pg    MCHC 34.1 31.5 - 36.5 g/dL    RDW 12.8 10.0 - 15.0 %    Platelet Count 222 150 - 450 10e9/L       Impression/Plan:    1. Psoriatic arthritis, controlled on humira.  psoriasis, PsA, RA. Given significant response to humira, he and I agree this to be continued and there is a risk of another option not controlling his disesae or skin  Immunosuppression, check CBC, ALT, AST, ALb, creat normal will check quant TB gold   2. Psoriasis, controlled on humira now-flare on every 21 day   3. Exercise, weight loss and work on diet. Educated on risks associated with #1-2, ASCVD, WISEMAN and DM. I educated him on the  new psoriatic arthritis  Guidelines, and need to work on weight loss and exercise. He has risk factors for metabolic syndrome, WISEMAN, and needs complete physical      Humira 40 mg SQ PEN every 14 days--Hold for infections  Recommend pneumonia 13 vaccine then wait 8 week to get pneumonia 23 vaccine. He will do this when 100% and off prednisone      RTC 6 month      The patient understood the rational for the diagnosis and treatment plan. Patient shared in the decision making. All questions were answered to best of my ability and the patient's satisfaction  Aniket HO, CNP, MSN  [facility]  Department of Rheumatology & Autoimmune Disorders

## 2020-02-17 NOTE — PATIENT INSTRUCTIONS
Recommend pneumonia vaccines     prevnar vaccine then 8 weeks and later pneumonia 23 this will last 5 years

## 2020-02-17 NOTE — LETTER
2/17/2020       RE: Leon Samuels  [address] [place] [address]     Dear Colleague,    Thank you for referring your patient, Leon Samuels, to the [facility] RHEUMATOLOGY at [facility] Please see a copy of my visit note below.    [facility] - Rheumatology Clinic Visit     Leon Samuels  is a 39 year old follow-up psoriatic arthritis, psoriasis. Humira since . Here with spouse. 4-2018 -RF -CCP - Factin - Mitochondrial  AST 47 -Hep B/c, MTB QG. Past Dr. Moncada      Past: Methotrexate (not fully effective, not helped with skin, head fuzzy) and never started the leflunomide. Prednisone, dramatic response    Copy forward  Initial visit 4-2018: Taking humira 40 mg injection every 2 weeks, tolerating well. No infections and asthma is controlled. Reports gained like 30 lbs since starting the humira. Dramatic improvement in control of psoriasis, inflammation (shoulders, knees, ankles, hips and back) with no pain and dramatic improved QoL. Before humira, not able to move or lift arms over head due to the pain in shoulder, walked like he was getting off a horse, major joints with swelling and changes in his nails---all resolved but when missed his dose (ran out all this started to come back in a few days). Mild symptoms few days before next dose due. Inflammatory back sx resolved. Some DDD lumbar spine but no radiculopathy and does not wish for PT. Snoring and wife thinks he has FAMILIA. Energy is fair. No s/sx DM. No dactylitis or tendonitis. Mild sx left 2nd MCP. No prednisone and only rare advil when due for humira (general LBP ache).     Copy forward  February 18, 2019  Last visit, elevated liver enzymes was to lose weight, avoid NSAID and alcohol and follow-up  With PCP which he did not do. He has gained weight. No asthma flares, nor any infections. Denies any fever, chills, SOB, ASHFORD, night sweats, or chest pain, or cough. Reports  healthy. Up until a month ago (delay due to insurance, supplier side) was on adalimumab (humira) 40 mg injection every 2 weeks. He will restart this in a few days when this comes. Noticing more tendonitis in elbows, hand pain with stiffness, cramping, right outer hand cramps. Intermittent arthralgia in ankles, chronic intermittent but no swelling. Intermittent dactylitis in fingers. EAS in hands. Shoulders are good. Inflammatory back symptoms now off adalimumab (humira), pain in SI joints, sides of hips bother. No redflags nor neuro signs or symptoms. Never any issues in knees, toes. All this but the intermittent ankle tender resolves on the adalimumab (humira). Has been taking ibuprofen 1000 mg every other day-tolerating no side-effects  x 2 weeks due to not being on the adalimumab (humira). Energy is low as not working out and gaining weight. The lack of adalimumab (humira) is affecting his arthritis making his job harder. Skin and nails are good. He knows he has a bad lower back, but feels better on the adalimumab (humira) but aware of the symptoms or signs to look for if not improves or changes. No other changes to PMSH     February 17, 2020  Last seen 8-2019-no changes, recommended follow-up PCP with elevated ALT 97    Bronchitis with asthma on 1-25. Reports cough for about 6 weeks, recent prednisone is helping. Doxy only mild help. Denies any fever, chills, SOB, ASHFORD, night sweats, or chest pain, or cough. Reports healthy. Finishing prednisone 20 mg day x 5 days.     Psoriatic arthritis and psoriasis is controlled. He did takes adalimumab (humira) every 3 week when was having illness symptoms. Some stiffness in hands, hips, PIPS and lower back; some psoriasis on face and scalp with this reduction.      Continues adalimumab (humira) 40 mg injection every 14 days. Tolerates well no side-effects     PMH:  Medical-Influenza B, Ashtma, PsA, psoriasis, lumbar DDD, elevated liver enzymes    Surgical-None   No blood  transfusions  Injury-None     FH:  No autoimmune disorders, UC, crohn's, SLE, PsA, gout.  No MS or cancer in family  Mother-healthy  GM-DM   Father-HTN, CVIs, smoker   Maternal side Psoriatic, PsA, RA   Siblings-healthy  Children-3 --1 has asthma  FAMILIA in family      SH:  Social 5 week Alcohol. Smoking. No IVDU. +Chew  +Children. Left handed. . Exavacting worker     [facility] personally reviewed and updated by me.    ROS:  CONSTITUTIONAL: No fevers, night sweats or unintentional weight change. No acute distress, swollen glands  EYES: No vision change, diplopia, pain in eyes or red eyes   EARS, NOSE, MOUTH, THROAT: No tinnitus or hearing change, no epistaxis or nasal discharge, no oral lesions, throat clear. Normal saliva pool.  No drymouth.   CARDIOVASCULAR: No chest pain, palpitations, or pain with walking, no orthopnea or PND   RESPIRATORY: No dyspnea, cough, shortness of breath or wheezing. No pleurisy.   GI: No nausea, vomiting, diarrhea or constipation, no abdominal pain, or blood in stools.   : No change in urine, no dysuria or hematuria   MUSCKL: No enthesitis, plantar fascitis or heel pain. See above   INTEGUMENTARY: No concerning lesions or moles   NEURO: No loss of strength or sensation, no numbness or tingling, no tremor, no dizziness, no headache. No falls   ENDO: No polyuria or polydipsia, no temperature intolerance   HEME/LYMPH:No concerning bumps, bleeding problems, or swollen lymph nodes. No recent infections, hospitalizations or new illnesses.   Otherwise 14 point ROS obtained, reviewed and found negative.     Physical exam:  Vitals: Blood pressure (!) 135/97, pulse 81, temperature 97.6  F (36.4  C), weight 118 kg (260 lb 3.2 oz), SpO2 93 %.  Wt Readings from Last 4 Encounters:   02/17/20 118 kg (260 lb 3.2 oz)   01/25/20 114.3 kg (251 lb 14.4 oz)   08/19/19 113.8 kg (250 lb 12.8 oz)   02/18/19 113 kg (249 lb 1.6 oz)     Constitutional: WD-WN-WG cooperative  Eyes: nl EOM, PERRLA, vision,  conjunctiva, sclera  ENT: nl external ears, nose, hearing, lips, teeth, gums, throat  Neck: no mass or thyroid enlargement  Resp: lungs clear to auscultation, nl to palpation, nl effort  CV: RRR, no murmurs, rubs or gallops, no edema  GI: no ABD mass or tenderness, no HSM  : not tested  Lymph: no cervical or epitrochlear nodes  MS: sides of hips sore. All TMJ, neck, shoulder, elbow, wrist, hand, spine, hip, knee, ankle, and foot joints were examined and otherwise found normal. Normal  strength. No active synovitis or deformity. Full ROM. Normal prayer sign. Negative Lhermitte's sign. No periuncle erythema  Skin: no nail pitting, alopecia, rash +psoriasis on face and scalp  Neuro: nl cranial nerves, strength, sensation, DTRs.   Psych: nl judgement, orientation, memory, affect.      Labs/Imaging:  Results for orders placed or performed in visit on 02/17/20   Creatinine     Status: None   Result Value Ref Range    Creatinine 1.15 0.66 - 1.25 mg/dL    GFR Estimate 80 >60 mL/min/[1.73_m2]    GFR Estimate If Black >90 >60 mL/min/[1.73_m2]   Albumin level     Status: None   Result Value Ref Range    Albumin 3.7 3.4 - 5.0 g/dL   ALT     Status: Abnormal   Result Value Ref Range    ALT 84 (H) 0 - 70 U/L   AST     Status: None   Result Value Ref Range    AST 32 0 - 45 U/L   CBC with platelets     Status: None   Result Value Ref Range    WBC 8.4 4.0 - 11.0 10e9/L    RBC Count 4.84 4.4 - 5.9 10e12/L    Hemoglobin 15.5 13.3 - 17.7 g/dL    Hematocrit 45.5 40.0 - 53.0 %    MCV 94 78 - 100 fl    MCH 32.0 26.5 - 33.0 pg    MCHC 34.1 31.5 - 36.5 g/dL    RDW 12.8 10.0 - 15.0 %    Platelet Count 222 150 - 450 10e9/L       Impression/Plan:    1. Psoriatic arthritis, controlled on humira. FH psoriasis, PsA, RA. Given significant response to humira, he and I agree this to be continued and there is a risk of another option not controlling his disesae or skin  Immunosuppression, check CBC, ALT, AST, ALb, creat normal will check quant  TB gold   2. Psoriasis, controlled on humira now-flare on every 21 day   3. Exercise, weight loss and work on diet. Educated on risks associated with #1-2, ASCVD, WISEMAN and DM. I educated him on the new psoriatic arthritis  Guidelines, and need to work on weight loss and exercise. He has risk factors for metabolic syndrome, WISEMAN, and needs complete physical      Humira 40 mg SQ PEN every 14 days--Hold for infections  Recommend pneumonia 13 vaccine then wait 8 week to get pneumonia 23 vaccine. He will do this when 100% and off prednisone      RTC 6 month      The patient understood the rational for the diagnosis and treatment plan. Patient shared in the decision making. All questions were answered to best of my ability and the patient's satisfaction  Aniket HO, CNP, MSN  [facility]  Department of Rheumatology & Autoimmune Disorders

## 2020-02-17 NOTE — NURSING NOTE
Chief Complaint   Patient presents with     RECHECK     Psoriatic Arthritis f/up     BP (!) 135/97 (BP Location: Right arm, Patient Position: Sitting, Cuff Size: Adult Regular)   Pulse 81   Temp 97.6  F (36.4  C)   Wt 118 kg (260 lb 3.2 oz)   SpO2 93%   BMI 38.42 kg/m        Aniket Gibson, EMT

## 2020-02-17 NOTE — PROGRESS NOTES
[facility] - Rheumatology Clinic Visit     Leon Samuels  is a 39 year old follow-up psoriatic arthritis, psoriasis. Humira since . Here with spouse. 4-2018 -RF -CCP - Factin - Mitochondrial  AST 47 -Hep B/c, MTB QG. Past Dr. Moncada      Past: Methotrexate (not fully effective, not helped with skin, head fuzzy) and never started the leflunomide. Prednisone, dramatic response    Copy forward  Initial visit 4-2018: Taking humira 40 mg injection every 2 weeks, tolerating well. No infections and asthma is controlled. Reports gained like 30 lbs since starting the humira. Dramatic improvement in control of psoriasis, inflammation (shoulders, knees, ankles, hips and back) with no pain and dramatic improved QoL. Before humira, not able to move or lift arms over head due to the pain in shoulder, walked like he was getting off a horse, major joints with swelling and changes in his nails---all resolved but when missed his dose (ran out all this started to come back in a few days). Mild symptoms few days before next dose due. Inflammatory back sx resolved. Some DDD lumbar spine but no radiculopathy and does not wish for PT. Snoring and wife thinks he has FAMILIA. Energy is fair. No s/sx DM. No dactylitis or tendonitis. Mild sx left 2nd MCP. No prednisone and only rare advil when due for humira (general LBP ache).     Copy forward  February 18, 2019  Last visit, elevated liver enzymes was to lose weight, avoid NSAID and alcohol and follow-up  With PCP which he did not do. He has gained weight. No asthma flares, nor any infections. Denies any fever, chills, SOB, ASHFORD, night sweats, or chest pain, or cough. Reports healthy. Up until a month ago (delay due to insurance, supplier side) was on adalimumab (humira) 40 mg injection every 2 weeks. He will restart this in a few days when this comes. Noticing more tendonitis in elbows, hand pain with stiffness, cramping, right outer hand cramps. Intermittent  arthralgia in ankles, chronic intermittent but no swelling. Intermittent dactylitis in fingers. EAS in hands. Shoulders are good. Inflammatory back symptoms now off adalimumab (humira), pain in SI joints, sides of hips bother. No redflags nor neuro signs or symptoms. Never any issues in knees, toes. All this but the intermittent ankle tender resolves on the adalimumab (humira). Has been taking ibuprofen 1000 mg every other day-tolerating no side-effects  x 2 weeks due to not being on the adalimumab (humira). Energy is low as not working out and gaining weight. The lack of adalimumab (humira) is affecting his arthritis making his job harder. Skin and nails are good. He knows he has a bad lower back, but feels better on the adalimumab (humira) but aware of the symptoms or signs to look for if not improves or changes. No other changes to PMSH     February 17, 2020  Last seen 8-2019-no changes, recommended follow-up PCP with elevated ALT 97    Bronchitis with asthma on 1-25. Reports cough for about 6 weeks, recent prednisone is helping. Doxy only mild help. Denies any fever, chills, SOB, ASHFORD, night sweats, or chest pain, or cough. Reports healthy. Finishing prednisone 20 mg day x 5 days.     Psoriatic arthritis and psoriasis is controlled. He did takes adalimumab (humira) every 3 week when was having illness symptoms. Some stiffness in hands, hips, PIPS and lower back; some psoriasis on face and scalp with this reduction.      Continues adalimumab (humira) 40 mg injection every 14 days. Tolerates well no side-effects     PMH:  Medical-Influenza B, Ashtma, PsA, psoriasis, lumbar DDD, elevated liver enzymes    Surgical-None   No blood transfusions  Injury-None     FH:  No autoimmune disorders, UC, crohn's, SLE, PsA, gout.  No MS or cancer in family  Mother-healthy  GM-DM   Father-HTN, CVIs, smoker   Maternal side Psoriatic, PsA, RA   Siblings-healthy  Children-3 --1 has asthma  FAMILIA in family      SH:  Social 5 week  Alcohol. Smoking. No IVDU. +Chew  +Children. Left handed. . Exavacting worker     [facility] personally reviewed and updated by me.    ROS:  CONSTITUTIONAL: No fevers, night sweats or unintentional weight change. No acute distress, swollen glands  EYES: No vision change, diplopia, pain in eyes or red eyes   EARS, NOSE, MOUTH, THROAT: No tinnitus or hearing change, no epistaxis or nasal discharge, no oral lesions, throat clear. Normal saliva pool.  No drymouth.   CARDIOVASCULAR: No chest pain, palpitations, or pain with walking, no orthopnea or PND   RESPIRATORY: No dyspnea, cough, shortness of breath or wheezing. No pleurisy.   GI: No nausea, vomiting, diarrhea or constipation, no abdominal pain, or blood in stools.   : No change in urine, no dysuria or hematuria   MUSCKL: No enthesitis, plantar fascitis or heel pain. See above   INTEGUMENTARY: No concerning lesions or moles   NEURO: No loss of strength or sensation, no numbness or tingling, no tremor, no dizziness, no headache. No falls   ENDO: No polyuria or polydipsia, no temperature intolerance   HEME/LYMPH:No concerning bumps, bleeding problems, or swollen lymph nodes. No recent infections, hospitalizations or new illnesses.   Otherwise 14 point ROS obtained, reviewed and found negative.     Physical exam:  Vitals: Blood pressure (!) 135/97, pulse 81, temperature 97.6  F (36.4  C), weight 118 kg (260 lb 3.2 oz), SpO2 93 %.  Wt Readings from Last 4 Encounters:   02/17/20 118 kg (260 lb 3.2 oz)   01/25/20 114.3 kg (251 lb 14.4 oz)   08/19/19 113.8 kg (250 lb 12.8 oz)   02/18/19 113 kg (249 lb 1.6 oz)     Constitutional: WD-WN-WG cooperative  Eyes: nl EOM, PERRLA, vision, conjunctiva, sclera  ENT: nl external ears, nose, hearing, lips, teeth, gums, throat  Neck: no mass or thyroid enlargement  Resp: lungs clear to auscultation, nl to palpation, nl effort  CV: RRR, no murmurs, rubs or gallops, no edema  GI: no ABD mass or tenderness, no HSM  : not  tested  Lymph: no cervical or epitrochlear nodes  MS: sides of hips sore. All TMJ, neck, shoulder, elbow, wrist, hand, spine, hip, knee, ankle, and foot joints were examined and otherwise found normal. Normal  strength. No active synovitis or deformity. Full ROM. Normal prayer sign. Negative Lhermitte's sign. No periuncle erythema  Skin: no nail pitting, alopecia, rash +psoriasis on face and scalp  Neuro: nl cranial nerves, strength, sensation, DTRs.   Psych: nl judgement, orientation, memory, affect.      Labs/Imaging:  Results for orders placed or performed in visit on 02/17/20   Creatinine     Status: None   Result Value Ref Range    Creatinine 1.15 0.66 - 1.25 mg/dL    GFR Estimate 80 >60 mL/min/[1.73_m2]    GFR Estimate If Black >90 >60 mL/min/[1.73_m2]   Albumin level     Status: None   Result Value Ref Range    Albumin 3.7 3.4 - 5.0 g/dL   ALT     Status: Abnormal   Result Value Ref Range    ALT 84 (H) 0 - 70 U/L   AST     Status: None   Result Value Ref Range    AST 32 0 - 45 U/L   CBC with platelets     Status: None   Result Value Ref Range    WBC 8.4 4.0 - 11.0 10e9/L    RBC Count 4.84 4.4 - 5.9 10e12/L    Hemoglobin 15.5 13.3 - 17.7 g/dL    Hematocrit 45.5 40.0 - 53.0 %    MCV 94 78 - 100 fl    MCH 32.0 26.5 - 33.0 pg    MCHC 34.1 31.5 - 36.5 g/dL    RDW 12.8 10.0 - 15.0 %    Platelet Count 222 150 - 450 10e9/L       Impression/Plan:    1. Psoriatic arthritis, controlled on humira. FH psoriasis, PsA, RA. Given significant response to humira, he and I agree this to be continued and there is a risk of another option not controlling his disesae or skin  Immunosuppression, check CBC, ALT, AST, ALb, creat normal will check quant TB gold   2. Psoriasis, controlled on humira now-flare on every 21 day   3. Exercise, weight loss and work on diet. Educated on risks associated with #1-2, ASCVD, WISEMAN and DM. I educated him on the new psoriatic arthritis  Guidelines, and need to work on weight loss and exercise.  He has risk factors for metabolic syndrome, WISEMAN, and needs complete physical      Humira 40 mg SQ PEN every 14 days--Hold for infections  Recommend pneumonia 13 vaccine then wait 8 week to get pneumonia 23 vaccine. He will do this when 100% and off prednisone      RTC 6 month      The patient understood the rational for the diagnosis and treatment plan. Patient shared in the decision making. All questions were answered to best of my ability and the patient's satisfaction  Aniket HO, CNP, MSN  [facility]  Department of Rheumatology & Autoimmune Disorders

## 2020-02-18 LAB
GAMMA INTERFERON BACKGROUND BLD IA-ACNC: 0.03 IU/ML
M TB IFN-G BLD-IMP: NEGATIVE
M TB IFN-G CD4+ BCKGRND COR BLD-ACNC: 8.65 IU/ML
MITOGEN IGNF BCKGRD COR BLD-ACNC: 0 IU/ML
MITOGEN IGNF BCKGRD COR BLD-ACNC: 0.01 IU/ML

## 2020-02-21 ENCOUNTER — TELEPHONE (OUTPATIENT)
Dept: RHEUMATOLOGY | Facility: CLINIC | Age: 40
End: 2020-02-21

## 2020-02-21 NOTE — TELEPHONE ENCOUNTER
[facility] Call Center    Phone Message    May a detailed message be left on voicemail: yes     Reason for Call: Other: Patient called said he wanted to know if [facility] sent the prior auth for the Humara to his pharmacy. Please send to [facility]      Action Taken: Other: p Rheumatology    Travel Screening: Not Applicable

## 2020-02-21 NOTE — TELEPHONE ENCOUNTER
Prior Authorization Specialty Medication Request    Medication/Dose: adalimumab (HUMIRA) 40 MG/0.8ML pen kit  ICD code (if different than what is on RX):  Psoriatic arthritis (H) (L40.50)  Previously Tried and Failed:      Important Lab Values:   Rationale:     Insurance Name: OPTRADHAMESRFRANCIA  Insurance ID: [id number]  Insurance Phone Number:     Pharmacy Information (if different than what is on RX)  Name:  [facility] - [place], [place] - [facility]  Phone:  [phone]

## 2020-02-24 NOTE — TELEPHONE ENCOUNTER
PA is complete and approved, [facility], mychart sent to patient

## 2020-03-30 ENCOUNTER — TELEPHONE (OUTPATIENT)
Dept: RHEUMATOLOGY | Facility: CLINIC | Age: 40
End: 2020-03-30

## 2020-03-30 NOTE — TELEPHONE ENCOUNTER
Received a fax from the [facility] that they have made several attempts to connect regarding delivery of the HUMIRA and have been able to do so.    I did leave a message with Leon to return my call.    Clare Lockett MSN, RN  Rheumatology RN Care Coordinator  [facility]

## 2020-08-24 ENCOUNTER — VIRTUAL VISIT (OUTPATIENT)
Dept: RHEUMATOLOGY | Facility: CLINIC | Age: 40
End: 2020-08-24
Attending: NURSE PRACTITIONER
Payer: COMMERCIAL

## 2020-08-24 VITALS — BODY MASS INDEX: 36.92 KG/M2 | WEIGHT: 250 LBS

## 2020-08-24 DIAGNOSIS — L40.50 PSORIATIC ARTHRITIS (H): Primary | ICD-10-CM

## 2020-08-24 DIAGNOSIS — R74.01 ELEVATED ALT MEASUREMENT: ICD-10-CM

## 2020-08-24 DIAGNOSIS — D84.9 IMMUNOSUPPRESSION (H): ICD-10-CM

## 2020-08-24 DIAGNOSIS — R53.83 FATIGUE, UNSPECIFIED TYPE: ICD-10-CM

## 2020-08-24 SDOH — HEALTH STABILITY: MENTAL HEALTH: HOW OFTEN DO YOU HAVE 6 OR MORE DRINKS ON ONE OCCASION?: WEEKLY

## 2020-08-24 ASSESSMENT — PAIN SCALES - GENERAL: PAINLEVEL: NO PAIN (0)

## 2020-08-24 NOTE — PROGRESS NOTES
"He could not understand how to get the [employer] to work    Leon Samuels is a 39 year old male who is being evaluated via a billable video visit.      The patient has been notified of following:     \"This video visit will be conducted via a call between you and your physician/provider. We have found that certain health care needs can be provided without the need for an in-person physical exam.  This service lets us provide the care you need with a video conversation.  If a prescription is necessary we can send it directly to your pharmacy.  If lab work is needed we can place an order for that and you can then stop by our lab to have the test done at a later time.    Video visits are billed at different rates depending on your insurance coverage.  Please reach out to your insurance provider with any questions.    If during the course of the call the physician/provider feels a video visit is not appropriate, you will not be charged for this service.\"    Patient has given verbal consent for Video visit? Yes  How would you like to obtain your AVS? MyChart    Will anyone else be joining your video visit? No        Video-Visit Details    Type of service:  Video Visit    Video Start Time: 705 AM  Video End Time: 715 AM    Originating Location (pt. Location): Home    Distant Location (provider location):   [facility]     Platform used for Video Visit: Appear Here he was not able to do this so went to [place] [facility] - Rheumatology Clinic Visit     Leon Samuels  is a 39 year old follow-up psoriatic arthritis, psoriasis. Humira since . 4-2018 -RF -CCP - Factin - Mitochondrial  AST 47 -Hep B/c, MTB QG. Past Dr. Moncada      Past: Methotrexate (not fully effective, not helped with skin, head fuzzy) and never started the leflunomide. Prednisone, dramatic response    Copy forward  Initial visit 4-2018: Taking humira 40 mg injection every 2 " weeks, tolerating well. No infections and asthma is controlled. Reports gained like 30 lbs since starting the humira. Dramatic improvement in control of psoriasis, inflammation (shoulders, knees, ankles, hips and back) with no pain and dramatic improved QoL. Before humira, not able to move or lift arms over head due to the pain in shoulder, walked like he was getting off a horse, major joints with swelling and changes in his nails---all resolved but when missed his dose (ran out all this started to come back in a few days). Mild symptoms few days before next dose due. Inflammatory back sx resolved. Some DDD lumbar spine but no radiculopathy and does not wish for PT. Snoring and wife thinks he has FAMILIA. Energy is fair. No s/sx DM. No dactylitis or tendonitis. Mild sx left 2nd MCP. No prednisone and only rare advil when due for humira (general LBP ache).     Copy forward  February 18, 2019  Last visit, elevated liver enzymes was to lose weight, avoid NSAID and alcohol and follow-up  With PCP which he did not do. He has gained weight. No asthma flares, nor any infections. Denies any fever, chills, SOB, ASHFORD, night sweats, or chest pain, or cough. Reports healthy. Up until a month ago (delay due to insurance, supplier side) was on adalimumab (humira) 40 mg injection every 2 weeks. He will restart this in a few days when this comes. Noticing more tendonitis in elbows, hand pain with stiffness, cramping, right outer hand cramps. Intermittent arthralgia in ankles, chronic intermittent but no swelling. Intermittent dactylitis in fingers. EAS in hands. Shoulders are good. Inflammatory back symptoms now off adalimumab (humira), pain in SI joints, sides of hips bother. No redflags nor neuro signs or symptoms. Never any issues in knees, toes. All this but the intermittent ankle tender resolves on the adalimumab (humira). Has been taking ibuprofen 1000 mg every other day-tolerating no side-effects  x 2 weeks due to not being on  "the adalimumab (humira). Energy is low as not working out and gaining weight. The lack of adalimumab (humira) is affecting his arthritis making his job harder. Skin and nails are good. He knows he has a bad lower back, but feels better on the adalimumab (humira) but aware of the symptoms or signs to look for if not improves or changes. No other changes to [facility]     August 24, 2020  Last seen 2-2020-no changes, recommended follow-up PCP with elevated ALT 84, reduced alcohol and weight.     Psoriatic arthritis and psoriasis is controlled. He did takes adalimumab (humira) every 3 week, mild stiffness and tight in fingers, sore in MCPs and PIP day before, but doing welll. Psoriasis 100% gone. No EAS, no flares or loss of ROM, dacytlitis, sausage fingers, or changes in appearance of joints. Good fist.     Lower energy, \"not like asthma\" more like out of shape. No chest pain, sweating or indigestions, or flu-like syndrome. Denies any fever, chills, SOB, ASHFORD, night sweats, or chest pain, high fever, cough, travel in last 14 days or exposure to covid-19 (coronavirus). Reports healthy. No diabetes like symptoms.       Continues adalimumab (humira) 40 mg injection every 21 days. Tolerates well no side-effects     PMH:  Medical-Influenza B, Ashtma, PsA, psoriasis, lumbar DDD, elevated liver enzymes, bronchitis 1-2020 (dox, prednisone 20 mg x 5 day)    Surgical-None   No blood transfusions  Injury-None     FH:  No autoimmune disorders, UC, crohn's, SLE, PsA, gout.  No MS or cancer in family  Mother-healthy  GM-DM   Father-HTN, CVIs, smoker   Maternal side Psoriatic, PsA, RA   Siblings-healthy  Children-3 --1 has asthma  FAMILIA in family      SH:  Social 5 week Alcohol. Smoking. No IVDU. +Chew  +Children. Left handed. . Exavacting worker     [facility] personally reviewed and updated by me.    ROS:  CONSTITUTIONAL: No fevers, night sweats or unintentional weight change. No acute distress, swollen glands  EYES: No vision change, " diplopia, pain in eyes or red eyes   EARS, NOSE, MOUTH, THROAT: No tinnitus or hearing change, no epistaxis or nasal discharge, no oral lesions, throat clear. Normal saliva pool.  No drymouth.   CARDIOVASCULAR: No chest pain, palpitations, or pain with walking, no orthopnea or PND   RESPIRATORY: No dyspnea, cough, shortness of breath or wheezing. No pleurisy.   GI: No nausea, vomiting, diarrhea or constipation, no abdominal pain, or blood in stools.   : No change in urine, no dysuria or hematuria   MUSCKL: No enthesitis, plantar fascitis or heel pain. See above   INTEGUMENTARY: No concerning lesions or moles   NEURO: No loss of strength or sensation, no numbness or tingling, no tremor, no dizziness, no headache. No falls   ENDO: No polyuria or polydipsia, no temperature intolerance   HEME/LYMPH:No concerning bumps, bleeding problems, or swollen lymph nodes. No recent infections, hospitalizations or new illnesses.   Otherwise 14 point ROS obtained, reviewed and found negative.     Assessment via video:    Constitutional: WD-WN-WG cooperative  Eyes: nl EOM, PERRLA, vision, conjunctiva, sclera  ENT: nl external ears, nose, hearing, lips, teeth, gums, throat  Neck: no mass or thyroid enlargement  RESP: No cough, no audible wheezing, able to talk in full sentences  Skin: no nail pitting, alopecia, rash  Neuro: Cranial nerves grossly intact.  Mentation and speech appropriate for age.  PSYCH: Mentation appears normal, affect normal/bright, judgement and insight intact, normal speech and appearance well-groomed, memory, affect.  Alert and oriented times 3; coherent speech, normal rate and volume, able to articulate logical thoughts, able   to abstract reason, no tangential thoughts, no hallucinations or delusions  His affect is normal and pleasant  MSK: All other TMJ, neck, shoulder, elbow, wrist, hand, knee, ankle, and foot joints found normal. No deformity or nodule. Full ROM.Normal prayer sign. Negative Lhermitte's  sign. No periuncle erythema  Remainder of exam unable to be completed due to video visits    Due to efforts to reduce the spread of COVID-19 in the clinic, state, nation, telephone or video visits are encouraged currently. Patient understands that diagnose and advice is limited by the inability to exam him/her/them face-to-face.  Discussed corovid-19 prevention, CDC guidelines/resources including hand washing, social distance and what to do for exposure signs or symptoms and where to go, to follow CDC/MDH guidelines, and if any signs or symptoms of infection to stop immunosuppression and seek immediate medical att. That prednisone can increase change of serious infections so should be avoided. COVID-19 Precautions. Discussed the importance of good hand washing techniques with soap and water for at least 20 seconds, avoid touching eyes, nose, mouth, avoiding crowds, social distancing. We also agree that the benefits of continued immunosuppression outweigh the risks of COVID-19 infection.     Labs/Imaging:  Component      Latest Ref Rng & Units 8/19/2019 2/17/2020   WBC      4.0 - 11.0 10e9/L 7.0 8.4   RBC Count      4.4 - 5.9 10e12/L 5.01 4.84   Hemoglobin      13.3 - 17.7 g/dL 16.1 15.5   Hematocrit      40.0 - 53.0 % 47.9 45.5   MCV      78 - 100 fl 96 94   MCH      26.5 - 33.0 pg 32.1 32.0   MCHC      31.5 - 36.5 g/dL 33.6 34.1   RDW      10.0 - 15.0 % 12.3 12.8   Platelet Count      150 - 450 10e9/L 210 222   Quantiferon-TB Gold Plus Result      NEG:Negative  Negative   TB1 Ag minus Nil Value      IU/mL  0.00   TB2 Ag minus Nil Value      IU/mL  0.01   Mitogen minus Nil Result      IU/mL  8.65   Nil Result      IU/mL  0.03   Creatinine      0.66 - 1.25 mg/dL 1.11 1.15   GFR Estimate      >60 mL/min/1.73:m2 83 80   GFR Estimate If Black      >60 mL/min/1.73:m2 >90 >90   Albumin      3.4 - 5.0 g/dL 3.9 3.7   ALT      0 - 70 U/L 97 (H) 84 (H)   AST      0 - 45 U/L 35 32       Impression/Plan:    1. Psoriatic  arthritis, controlled on humira. FH psoriasis, PsA, RA. Given significant response to humira, he and I agree this to be continued and there is a risk of another option not controlling his disesae or skin  Immunosuppression, check CBC, ALT, AST, ALb, creat every 6 month, elevated ALT risk of NAFLD, metabolic syndrome, alcohol and weight may be contributing. Advise follow-up PCP (should be getting annual physicals) and can refer to hepatology if he agrees.   2. Psoriasis, controlled on humira now-flare on every 21 day   3. Exercise, weight loss and work on diet. Educated on risks associated with #1-2, ASCVD, WISEMAN and DM. I educated him on the new psoriatic arthritis  Guidelines, and need to work on weight loss and exercise. He has risk factors for metabolic syndrome, WISEMAN, and needs complete physical   4.  Low energy, needs complete physical with PCP     Humira 40 mg SQ PEN every 14-21 days--Hold for infections. Informed him of risk of DI-lupus, and risk of takes adalimumab (humira) to long between injections  Recommend pneumonia 13 vaccine then wait 8 week to get pneumonia 23 vaccine.      RTC 6 month      The patient understood the rational for the diagnosis and treatment plan. Patient shared in the decision making. All questions were answered to best of my ability and the patient's satisfaction  Aniket HO, CNP, MSN  [facility]  Department of Rheumatology & Autoimmune Disorders    Education:   1. Immunization: Reviewed CDC guidelines with patient updated, information provided to patient based on CDC guidelines for vaccination recommendations, patient will discuss with primary provider  2. Bone Health:Educated on adequate calcium and vitamin D intake, Educated on exercise, Glucocorticoid education discussed risks, benefits & potential long term side effects from use per primary provider  3. Discussed routine annual physicals, cancer screening, cardiac risk monitoring, bone health and  primary care with primary care provider. Also, educated glucocorticoid increase change of infections including shingles.   4. Educated on diagnosis, prognosis, labs, imaging, treatment options (including risks, benefits, risk of no treatment), medications (use, dose, side-effects, risks of medications including infection/cancer/bone marrow suppression, lab monitoring), infections what to do, plan of cares, goals of treatment. Clinic cards given. Websites given for education and resources.   5. Educated in Rheumatology we do not prescribe narcotics or manage chronic pain, the patient will need to discuss with primary care or go to a pain clinic for management.   6. Discussed corovid-19 prevention high risk patient precautions, social distancing, hand washing, follow CDC guidelines/resources and what to do for exposure signs or symptoms and where to go. Goal to minimize prednisone exposure to reduce infection risks. Discussed the importance of good hand washing techniques with soap and water for at least 20 seconds, avoid touching eyes, nose, mouth, avoiding crowds, social distancing. If any signs or symptoms of infection, call 911 go to ER. We also agree that the benefits of continued immunosuppression outweigh the risks of COVID-19 infection. Patient agreed.

## 2020-08-24 NOTE — LETTER
8/24/2020       RE: Leon Samuels  [address]   [place] [address]     Dear Colleague,    Thank you for referring your patient, Leon Samuels, to the [facility] RHEUMATOLOGY at [facility]. Please see a copy of my visit note below.    He could not understand how to get the [facility] mychart to work    Leon Samuels is a 39 year old male who is being evaluated via a billable video visit.          Video-Visit Details    Type of service:  Video Visit    Video Start Time: 705 AM  Video End Time: 715 AM    Originating Location (pt. Location): Home    Distant Location (provider location):  [facility] RHEUMATOLOGY     Platform used for Video Visit: [employer] he was not able to do this so went to [place] [facility] - Rheumatology Clinic Visit     Leon Samuels  is a 39 year old follow-up psoriatic arthritis, psoriasis. Humira since . 4-2018 -RF -CCP - Factin - Mitochondrial  AST 47 -Hep B/c, MTB QG. Past Dr. Moncada      Past: Methotrexate (not fully effective, not helped with skin, head fuzzy) and never started the leflunomide. Prednisone, dramatic response    Copy forward  Initial visit 4-2018: Taking humira 40 mg injection every 2 weeks, tolerating well. No infections and asthma is controlled. Reports gained like 30 lbs since starting the humira. Dramatic improvement in control of psoriasis, inflammation (shoulders, knees, ankles, hips and back) with no pain and dramatic improved QoL. Before humira, not able to move or lift arms over head due to the pain in shoulder, walked like he was getting off a horse, major joints with swelling and changes in his nails---all resolved but when missed his dose (ran out all this started to come back in a few days). Mild symptoms few days before next dose due. Inflammatory back sx resolved. Some DDD lumbar spine but no radiculopathy and does not wish for PT. Snoring and  wife thinks he has FAMILIA. Energy is fair. No s/sx DM. No dactylitis or tendonitis. Mild sx left 2nd MCP. No prednisone and only rare advil when due for humira (general LBP ache).     Copy forward  February 18, 2019  Last visit, elevated liver enzymes was to lose weight, avoid NSAID and alcohol and follow-up  With PCP which he did not do. He has gained weight. No asthma flares, nor any infections. Denies any fever, chills, SOB, ASHFORD, night sweats, or chest pain, or cough. Reports healthy. Up until a month ago (delay due to insurance, supplier side) was on adalimumab (humira) 40 mg injection every 2 weeks. He will restart this in a few days when this comes. Noticing more tendonitis in elbows, hand pain with stiffness, cramping, right outer hand cramps. Intermittent arthralgia in ankles, chronic intermittent but no swelling. Intermittent dactylitis in fingers. EAS in hands. Shoulders are good. Inflammatory back symptoms now off adalimumab (humira), pain in SI joints, sides of hips bother. No redflags nor neuro signs or symptoms. Never any issues in knees, toes. All this but the intermittent ankle tender resolves on the adalimumab (humira). Has been taking ibuprofen 1000 mg every other day-tolerating no side-effects  x 2 weeks due to not being on the adalimumab (humira). Energy is low as not working out and gaining weight. The lack of adalimumab (humira) is affecting his arthritis making his job harder. Skin and nails are good. He knows he has a bad lower back, but feels better on the adalimumab (humira) but aware of the symptoms or signs to look for if not improves or changes. No other changes to [facility]     August 24, 2020  Last seen 2-2020-no changes, recommended follow-up PCP with elevated ALT 84, reduced alcohol and weight.     Psoriatic arthritis and psoriasis is controlled. He did takes adalimumab (humira) every 3 week, mild stiffness and tight in fingers, sore in MCPs and PIP day before, but doing welll. Psoriasis  "100% gone. No EAS, no flares or loss of ROM, dacytlitis, sausage fingers, or changes in appearance of joints. Good fist.     Lower energy, \"not like asthma\" more like out of shape. No chest pain, sweating or indigestions, or flu-like syndrome. Denies any fever, chills, SOB, ASHFORD, night sweats, or chest pain, high fever, cough, travel in last 14 days or exposure to covid-19 (coronavirus). Reports healthy. No diabetes like symptoms.       Continues adalimumab (humira) 40 mg injection every 21 days. Tolerates well no side-effects     PMH:  Medical-Influenza B, Ashtma, PsA, psoriasis, lumbar DDD, elevated liver enzymes, bronchitis 1-2020 (dox, prednisone 20 mg x 5 day)    Surgical-None   No blood transfusions  Injury-None     FH:  No autoimmune disorders, UC, crohn's, SLE, PsA, gout.  No MS or cancer in family  Mother-healthy  GM-DM   Father-HTN, CVIs, smoker   Maternal side Psoriatic, PsA, RA   Siblings-healthy  Children-3 --1 has asthma  FAMILIA in family      SH:  Social 5 week Alcohol. Smoking. No IVDU. +Chew  +Children. Left handed. . [employer] worker     PMSH personally reviewed and updated by me.    ROS:  CONSTITUTIONAL: No fevers, night sweats or unintentional weight change. No acute distress, swollen glands  EYES: No vision change, diplopia, pain in eyes or red eyes   EARS, NOSE, MOUTH, THROAT: No tinnitus or hearing change, no epistaxis or nasal discharge, no oral lesions, throat clear. Normal saliva pool.  No drymouth.   CARDIOVASCULAR: No chest pain, palpitations, or pain with walking, no orthopnea or PND   RESPIRATORY: No dyspnea, cough, shortness of breath or wheezing. No pleurisy.   GI: No nausea, vomiting, diarrhea or constipation, no abdominal pain, or blood in stools.   : No change in urine, no dysuria or hematuria   MUSCKL: No enthesitis, plantar fascitis or heel pain. See above   INTEGUMENTARY: No concerning lesions or moles   NEURO: No loss of strength or sensation, no numbness or tingling, no " tremor, no dizziness, no headache. No falls   ENDO: No polyuria or polydipsia, no temperature intolerance   HEME/LYMPH:No concerning bumps, bleeding problems, or swollen lymph nodes. No recent infections, hospitalizations or new illnesses.   Otherwise 14 point ROS obtained, reviewed and found negative.     Assessment via video:    Constitutional: WD-WN-WG cooperative  Eyes: nl EOM, PERRLA, vision, conjunctiva, sclera  ENT: nl external ears, nose, hearing, lips, teeth, gums, throat  Neck: no mass or thyroid enlargement  RESP: No cough, no audible wheezing, able to talk in full sentences  Skin: no nail pitting, alopecia, rash  Neuro: Cranial nerves grossly intact.  Mentation and speech appropriate for age.  PSYCH: Mentation appears normal, affect normal/bright, judgement and insight intact, normal speech and appearance well-groomed, memory, affect.  Alert and oriented times 3; coherent speech, normal rate and volume, able to articulate logical thoughts, able   to abstract reason, no tangential thoughts, no hallucinations or delusions  His affect is normal and pleasant  MSK: All other TMJ, neck, shoulder, elbow, wrist, hand, knee, ankle, and foot joints found normal. No deformity or nodule. Full ROM.Normal prayer sign. Negative Lhermitte's sign. No periuncle erythema  Remainder of exam unable to be completed due to video visits    Due to efforts to reduce the spread of COVID-19 in the clinic, state, nation, telephone or video visits are encouraged currently. Patient understands that diagnose and advice is limited by the inability to exam him/her/them face-to-face.  Discussed corovid-19 prevention, CDC guidelines/resources including hand washing, social distance and what to do for exposure signs or symptoms and where to go, to follow CDC/MDH guidelines, and if any signs or symptoms of infection to stop immunosuppression and seek immediate medical att. That prednisone can increase change of serious infections so should  be avoided. COVID-19 Precautions. Discussed the importance of good hand washing techniques with soap and water for at least 20 seconds, avoid touching eyes, nose, mouth, avoiding crowds, social distancing. We also agree that the benefits of continued immunosuppression outweigh the risks of COVID-19 infection.     Labs/Imaging:  Component      Latest Ref Rng & Units 8/19/2019 2/17/2020   WBC      4.0 - 11.0 10e9/L 7.0 8.4   RBC Count      4.4 - 5.9 10e12/L 5.01 4.84   Hemoglobin      13.3 - 17.7 g/dL 16.1 15.5   Hematocrit      40.0 - 53.0 % 47.9 45.5   MCV      78 - 100 fl 96 94   MCH      26.5 - 33.0 pg 32.1 32.0   MCHC      31.5 - 36.5 g/dL 33.6 34.1   RDW      10.0 - 15.0 % 12.3 12.8   Platelet Count      150 - 450 10e9/L 210 222   Quantiferon-TB Gold Plus Result      NEG:Negative  Negative   TB1 Ag minus Nil Value      IU/mL  0.00   TB2 Ag minus Nil Value      IU/mL  0.01   Mitogen minus Nil Result      IU/mL  8.65   Nil Result      IU/mL  0.03   Creatinine      0.66 - 1.25 mg/dL 1.11 1.15   GFR Estimate      >60 mL/min/1.73:m2 83 80   GFR Estimate If Black      >60 mL/min/1.73:m2 >90 >90   Albumin      3.4 - 5.0 g/dL 3.9 3.7   ALT      0 - 70 U/L 97 (H) 84 (H)   AST      0 - 45 U/L 35 32       Impression/Plan:    1. Psoriatic arthritis, controlled on humira.  psoriasis, PsA, RA. Given significant response to humira, he and I agree this to be continued and there is a risk of another option not controlling his disesae or skin  Immunosuppression, check CBC, ALT, AST, ALb, creat every 6 month, elevated ALT risk of NAFLD, metabolic syndrome, alcohol and weight may be contributing. Advise follow-up PCP (should be getting annual physicals) and can refer to hepatology if he agrees.   2. Psoriasis, controlled on humira now-flare on every 21 day   3. Exercise, weight loss and work on diet. Educated on risks associated with #1-2, ASCVD, WISEMAN and DM. I educated him on the new psoriatic arthritis  Guidelines, and need to  work on weight loss and exercise. He has risk factors for metabolic syndrome, WISEMAN, and needs complete physical   4.  Low energy, needs complete physical with PCP     Humira 40 mg SQ PEN every 14-21 days--Hold for infections. Informed him of risk of DI-lupus, and risk of takes adalimumab (humira) to long between injections  Recommend pneumonia 13 vaccine then wait 8 week to get pneumonia 23 vaccine.      RTC 6 month      The patient understood the rational for the diagnosis and treatment plan. Patient shared in the decision making. All questions were answered to best of my ability and the patient's satisfaction  Aniket HO, CNP, MSN  [facility]  Department of Rheumatology & Autoimmune Disorders    Education:   1. Immunization: Reviewed CDC guidelines with patient updated, information provided to patient based on CDC guidelines for vaccination recommendations, patient will discuss with primary provider  2. Bone Health:Educated on adequate calcium and vitamin D intake, Educated on exercise, Glucocorticoid education discussed risks, benefits & potential long term side effects from use per primary provider  3. Discussed routine annual physicals, cancer screening, cardiac risk monitoring, bone health and primary care with primary care provider. Also, educated glucocorticoid increase change of infections including shingles.   4. Educated on diagnosis, prognosis, labs, imaging, treatment options (including risks, benefits, risk of no treatment), medications (use, dose, side-effects, risks of medications including infection/cancer/bone marrow suppression, lab monitoring), infections what to do, plan of cares, goals of treatment. Clinic cards given. Websites given for education and resources.   5. Educated in Rheumatology we do not prescribe narcotics or manage chronic pain, the patient will need to discuss with primary care or go to a pain clinic for management.   6. Discussed corovid-19  prevention high risk patient precautions, social distancing, hand washing, follow CDC guidelines/resources and what to do for exposure signs or symptoms and where to go. Goal to minimize prednisone exposure to reduce infection risks. Discussed the importance of good hand washing techniques with soap and water for at least 20 seconds, avoid touching eyes, nose, mouth, avoiding crowds, social distancing. If any signs or symptoms of infection, call 911 go to ER. We also agree that the benefits of continued immunosuppression outweigh the risks of COVID-19 infection. Patient agreed.       Again, thank you for allowing me to participate in the care of your patient.      Sincerely,    [place] Avitia CNP

## 2020-08-24 NOTE — PATIENT INSTRUCTIONS
Schedule complete annual physical with primary care provider --need prevnar 13 then 8 weeks later the pneumonia 23 vaccine, discuss lower energy, get check for diatbetes and your heart and liver disease, as you have a risk of metabolic syndrome and fatty liver, I advise weight loss and exercise

## 2021-01-14 ENCOUNTER — HEALTH MAINTENANCE LETTER (OUTPATIENT)
Age: 41
End: 2021-01-14

## 2021-02-18 DIAGNOSIS — L40.50 PSORIATIC ARTHRITIS (H): ICD-10-CM

## 2021-02-22 NOTE — TELEPHONE ENCOUNTER
adalimumab (HUMIRA) 40 MG/0.8ML pen kit  Last Written Prescription Date:  2/17/20  Last Fill Quantity: 1 kit,   # refills: 5  Last Office Visit : 8/24/20  Future Office visit:  None

## 2021-10-07 DIAGNOSIS — L40.50 PSORIATIC ARTHRITIS (H): ICD-10-CM

## 2021-10-11 ENCOUNTER — TELEPHONE (OUTPATIENT)
Dept: RHEUMATOLOGY | Facility: CLINIC | Age: 41
End: 2021-10-11

## 2021-10-11 DIAGNOSIS — L40.50 PSORIATIC ARTHRITIS (H): ICD-10-CM

## 2021-10-23 ENCOUNTER — HEALTH MAINTENANCE LETTER (OUTPATIENT)
Age: 41
End: 2021-10-23

## 2022-02-12 ENCOUNTER — HEALTH MAINTENANCE LETTER (OUTPATIENT)
Age: 42
End: 2022-02-12

## 2022-05-04 ENCOUNTER — TELEPHONE (OUTPATIENT)
Dept: RHEUMATOLOGY | Facility: CLINIC | Age: 42
End: 2022-05-04
Payer: COMMERCIAL

## 2022-05-04 NOTE — TELEPHONE ENCOUNTER
PA Initiation    Medication: Humira PA Renewal   Insurance Company: [employer] ([facility]) - Phone [phone] Fax [phone]  Pharmacy Filling the Rx:    Filling Pharmacy Phone:    Filling Pharmacy Fax:    Start Date: 5/4/2022        ALISHA LIND (Key: [id number]

## 2022-10-10 ENCOUNTER — HEALTH MAINTENANCE LETTER (OUTPATIENT)
Age: 42
End: 2022-10-10

## 2022-12-20 DIAGNOSIS — L40.50 PSORIATIC ARTHRITIS (H): ICD-10-CM

## 2022-12-20 NOTE — TELEPHONE ENCOUNTER
Centralized Medication Refill Team note:   adalimumab (HUMIRA PEN) 40 MG/0.8ML pen kit  Last Written Prescription Date:  10/8/21  Last Fill Quantity: 4.8 ML ,   # refills: 3  Last Office Visit : 8/24/20 CLAUDETTE  Future Office visit:  7/13/23 [facility] PSIORIATIC ARTHRITIS    Routing refill request to provider for review/approval because:  LAST SEEN 8/24/2020/ CLAUDETTE, HAS APPT 7/13/23 WITH [facility].   LAST CBC/LFT/CR 2/17/2020    CAREEVER[facility] AND MEDIA TAB REVIEWED

## 2022-12-20 NOTE — TELEPHONE ENCOUNTER
Health Call Center    Phone Message    May a detailed message be left on voicemail: yes     Reason for Call: Medication Refill Request    Has the patient contacted the pharmacy for the refill? Yes   Name of medication being requested: Humira  Provider who prescribed the medication: Aniket Henriquez  Pharmacy: [facility] - [place], [place] - [address]  Date medication is needed: 12/20- Pt was due to take this over the weekend.     Pt was established with Aniket Henriquez.   Pt was scheduled in next available with Dr Gatica. Pt added to wait list.     Action Taken: Message routed to:  [facility] Med Refills Team     Travel Screening: Not Applicable

## 2022-12-21 NOTE — TELEPHONE ENCOUNTER
Prior Authorization Approval    Authorization Effective Date:    Authorization Expiration Date: 5/4/2023  Medication: Humira PA Renewal   Approved Dose/Quantity: [id number]  Reference #: [id number]   Insurance Company: [employer] ([facility]) - Phone [phone] Fax [phone]  Expected CoPay:       CoPay Card Available:      Foundation Assistance Needed:    Which Pharmacy is filling the prescription (Not needed for infusion/clinic administered): [facility] - [address]  Pharmacy Notified: Yes  Patient Notified: Yes

## 2023-03-25 ENCOUNTER — HEALTH MAINTENANCE LETTER (OUTPATIENT)
Age: 43
End: 2023-03-25

## 2023-05-02 NOTE — TELEPHONE ENCOUNTER
NOTES Status Details   OFFICE NOTE from referring provider     OFFICE NOTE from other specialist Internal 08.24.2020 Aniket Henriquez APRN CNP   DISCHARGE SUMMARY from hospital     DISCHARGE REPORT from the ER     MEDICATION LIST Internal    LABS (Any and all labs)          Biopsy/pathology (Anything related to diagnoses I.e. fluid aspirations, lip biopsy, muscle biopsy)               Imaging (All imaging related to diagnoses)     Echo     HRCT     CXR     EMG                    Scleroderma/Dermatomyositis diagnoses     Previous Cardiology notes      Previous Pulmonary notes     Previous Dermatology notes     Previous GI notes     Lupus diagnoses     Previous Nephrology notes     Previous Dermatology notes     Previous Cardiology notes

## 2023-06-27 ENCOUNTER — TELEPHONE (OUTPATIENT)
Dept: RHEUMATOLOGY | Facility: CLINIC | Age: 43
End: 2023-06-27
Payer: COMMERCIAL

## 2023-07-05 NOTE — TELEPHONE ENCOUNTER
Patient will be seen next week by Dr. Gatica, if my reading of his appointment list is correct.  Is there a concern waiting until then, if he has not been evaluated or been prescribed/using medication since 2020?  Thanks.

## 2023-07-13 ENCOUNTER — LAB (OUTPATIENT)
Dept: LAB | Facility: CLINIC | Age: 43
End: 2023-07-13
Payer: COMMERCIAL

## 2023-07-13 ENCOUNTER — OFFICE VISIT (OUTPATIENT)
Dept: RHEUMATOLOGY | Facility: CLINIC | Age: 43
End: 2023-07-13
Attending: INTERNAL MEDICINE
Payer: COMMERCIAL

## 2023-07-13 ENCOUNTER — PRE VISIT (OUTPATIENT)
Dept: RHEUMATOLOGY | Facility: CLINIC | Age: 43
End: 2023-07-13
Payer: COMMERCIAL

## 2023-07-13 VITALS
BODY MASS INDEX: 40.91 KG/M2 | WEIGHT: 277 LBS | TEMPERATURE: 97.8 F | OXYGEN SATURATION: 90 % | DIASTOLIC BLOOD PRESSURE: 83 MMHG | HEART RATE: 62 BPM | SYSTOLIC BLOOD PRESSURE: 132 MMHG

## 2023-07-13 DIAGNOSIS — L40.50 PSORIATIC ARTHRITIS (H): ICD-10-CM

## 2023-07-13 DIAGNOSIS — Z79.899 HIGH RISK MEDICATION USE: ICD-10-CM

## 2023-07-13 DIAGNOSIS — Z11.59 ENCOUNTER FOR SCREENING FOR OTHER VIRAL DISEASES: ICD-10-CM

## 2023-07-13 DIAGNOSIS — E66.9 OBESITY, UNSPECIFIED CLASSIFICATION, UNSPECIFIED OBESITY TYPE, UNSPECIFIED WHETHER SERIOUS COMORBIDITY PRESENT: ICD-10-CM

## 2023-07-13 DIAGNOSIS — L40.50 PSORIATIC ARTHRITIS (H): Primary | ICD-10-CM

## 2023-07-13 DIAGNOSIS — Z91.010 PEANUT ALLERGY: ICD-10-CM

## 2023-07-13 LAB
ALBUMIN SERPL BCG-MCNC: 4.6 G/DL (ref 3.5–5.2)
ALT SERPL W P-5'-P-CCNC: 97 U/L (ref 0–70)
AST SERPL W P-5'-P-CCNC: 53 U/L (ref 0–45)
BASOPHILS # BLD AUTO: 0 10E3/UL (ref 0–0.2)
BASOPHILS NFR BLD AUTO: 1 %
CREAT SERPL-MCNC: 1.15 MG/DL (ref 0.67–1.17)
CRP SERPL-MCNC: 3.26 MG/L
EOSINOPHIL # BLD AUTO: 0.3 10E3/UL (ref 0–0.7)
EOSINOPHIL NFR BLD AUTO: 4 %
ERYTHROCYTE [DISTWIDTH] IN BLOOD BY AUTOMATED COUNT: 12.4 % (ref 10–15)
ERYTHROCYTE [SEDIMENTATION RATE] IN BLOOD BY WESTERGREN METHOD: 9 MM/HR (ref 0–15)
GFR SERPL CREATININE-BSD FRML MDRD: 81 ML/MIN/1.73M2
HBV CORE AB SERPL QL IA: NONREACTIVE
HBV SURFACE AG SERPL QL IA: NONREACTIVE
HCT VFR BLD AUTO: 45.9 % (ref 40–53)
HCV AB SERPL QL IA: NONREACTIVE
HGB BLD-MCNC: 15.7 G/DL (ref 13.3–17.7)
HIV 1+2 AB+HIV1 P24 AG SERPL QL IA: NONREACTIVE
IMM GRANULOCYTES # BLD: 0 10E3/UL
IMM GRANULOCYTES NFR BLD: 0 %
LYMPHOCYTES # BLD AUTO: 2.8 10E3/UL (ref 0.8–5.3)
LYMPHOCYTES NFR BLD AUTO: 43 %
MCH RBC QN AUTO: 32.4 PG (ref 26.5–33)
MCHC RBC AUTO-ENTMCNC: 34.2 G/DL (ref 31.5–36.5)
MCV RBC AUTO: 95 FL (ref 78–100)
MONOCYTES # BLD AUTO: 0.8 10E3/UL (ref 0–1.3)
MONOCYTES NFR BLD AUTO: 12 %
NEUTROPHILS # BLD AUTO: 2.6 10E3/UL (ref 1.6–8.3)
NEUTROPHILS NFR BLD AUTO: 40 %
PLATELET # BLD AUTO: 187 10E3/UL (ref 150–450)
RBC # BLD AUTO: 4.84 10E6/UL (ref 4.4–5.9)
WBC # BLD AUTO: 6.6 10E3/UL (ref 4–11)

## 2023-07-13 PROCEDURE — 86481 TB AG RESPONSE T-CELL SUSP: CPT

## 2023-07-13 PROCEDURE — 86140 C-REACTIVE PROTEIN: CPT

## 2023-07-13 PROCEDURE — 87389 HIV-1 AG W/HIV-1&-2 AB AG IA: CPT

## 2023-07-13 PROCEDURE — 82565 ASSAY OF CREATININE: CPT

## 2023-07-13 PROCEDURE — 84450 TRANSFERASE (AST) (SGOT): CPT

## 2023-07-13 PROCEDURE — 82040 ASSAY OF SERUM ALBUMIN: CPT

## 2023-07-13 PROCEDURE — 86803 HEPATITIS C AB TEST: CPT

## 2023-07-13 PROCEDURE — 84460 ALANINE AMINO (ALT) (SGPT): CPT

## 2023-07-13 PROCEDURE — 36415 COLL VENOUS BLD VENIPUNCTURE: CPT

## 2023-07-13 PROCEDURE — 87340 HEPATITIS B SURFACE AG IA: CPT

## 2023-07-13 PROCEDURE — 86704 HEP B CORE ANTIBODY TOTAL: CPT

## 2023-07-13 PROCEDURE — 99204 OFFICE O/P NEW MOD 45 MIN: CPT | Performed by: INTERNAL MEDICINE

## 2023-07-13 PROCEDURE — 85652 RBC SED RATE AUTOMATED: CPT

## 2023-07-13 PROCEDURE — 99214 OFFICE O/P EST MOD 30 MIN: CPT | Performed by: INTERNAL MEDICINE

## 2023-07-13 PROCEDURE — 85025 COMPLETE CBC W/AUTO DIFF WBC: CPT

## 2023-07-13 RX ORDER — EPINEPHRINE 0.3 MG/.3ML
0.3 INJECTION SUBCUTANEOUS PRN
Qty: 2 EACH | Refills: 1 | Status: SHIPPED | OUTPATIENT
Start: 2023-07-13

## 2023-07-13 ASSESSMENT — PAIN SCALES - GENERAL: PAINLEVEL: MODERATE PAIN (5)

## 2023-07-13 NOTE — LETTER
7/13/2023       RE: Leon Samuels  [id number] [place] [address]     Dear Colleague,    Thank you for referring your patient, Leon Samuels, to the [facility] RHEUMATOLOGY [facility] [place] at [facility]. Please see a copy of my visit note below.    [facility] - Rheumatology Clinic Visit   July 13, 2023 New Consult- re-establishing care after being last seen in August 2020    Leon Samuels  is a 42 year old follow-up psoriatic arthritis, psoriasis. Humira since . 4-2018 -RF -[facility] - Factin - Mitochondrial  AST 47 -Hep B/c, MTB QG. Past Dr. Moncada      Past: Methotrexate (not fully effective, not helped with skin, head fuzzy) and never started the leflunomide. Prednisone, dramatic response    Copy forward  Initial visit 4-2018: Taking humira 40 mg injection every 2 weeks, tolerating well. No infections and asthma is controlled. Reports gained like 30 lbs since starting the humira. Dramatic improvement in control of psoriasis, inflammation (shoulders, knees, ankles, hips and back) with no pain and dramatic improved QoL. Before humira, not able to move or lift arms over head due to the pain in shoulder, walked like he was getting off a horse, major joints with swelling and changes in his nails---all resolved but when missed his dose (ran out all this started to come back in a few days). Mild symptoms few days before next dose due. Inflammatory back sx resolved. Some DDD lumbar spine but no radiculopathy and does not wish for PT. Snoring and wife thinks he has FAMILIA. Energy is fair. No s/sx DM. No dactylitis or tendonitis. Mild sx left 2nd MCP. No prednisone and only rare advil when due for humira (general LBP ache).     Copy forward  February 18, 2019  Last visit, elevated liver enzymes was to lose weight, avoid NSAID and alcohol and follow-up  With PCP which he did not do. He has gained weight. No asthma flares,  "nor any infections. Denies any fever, chills, SOB, ASHFORD, night sweats, or chest pain, or cough. Reports healthy. Up until a month ago (delay due to insurance, supplier side) was on adalimumab (humira) 40 mg injection every 2 weeks. He will restart this in a few days when this comes. Noticing more tendonitis in elbows, hand pain with stiffness, cramping, right outer hand cramps. Intermittent arthralgia in ankles, chronic intermittent but no swelling. Intermittent dactylitis in fingers. EAS in hands. Shoulders are good. Inflammatory back symptoms now off adalimumab (humira), pain in SI joints, sides of hips bother. No redflags nor neuro signs or symptoms. Never any issues in knees, toes. All this but the intermittent ankle tender resolves on the adalimumab (humira). Has been taking ibuprofen 1000 mg every other day-tolerating no side-effects  x 2 weeks due to not being on the adalimumab (humira). Energy is low as not working out and gaining weight. The lack of adalimumab (humira) is affecting his arthritis making his job harder. Skin and nails are good. He knows he has a bad lower back, but feels better on the adalimumab (humira) but aware of the symptoms or signs to look for if not improves or changes. No other changes to [facility]     August 24, 2020  Last seen 2-2020-no changes, recommended follow-up PCP with elevated ALT 84, reduced alcohol and weight.     Psoriatic arthritis and psoriasis is controlled. He did takes adalimumab (humira) every 3 week, mild stiffness and tight in fingers, sore in MCPs and PIP day before, but doing welll. Psoriasis 100% gone. No EAS, no flares or loss of ROM, dacytlitis, sausage fingers, or changes in appearance of joints. Good fist.     Lower energy, \"not like asthma\" more like out of shape. No chest pain, sweating or indigestions, or flu-like syndrome. Denies any fever, chills, SOB, ASHFORD, night sweats, or chest pain, high fever, cough, travel in last 14 days or exposure to covid-19 " (coronavirus). Reports healthy. No diabetes like symptoms.       Continues adalimumab (humira) 40 mg injection every 21 days. Tolerates well no side-effects     7/13/2023  - Leon presents for follow-up today after not having been seen since August 24, 2020.    - roughly 2016/2017 went to chiropractor/PT/steroid injection. Specifically back was locking up, biggest issue. Peripheral joints were also a problem, but not as much as the back. Not making substantial difference. Then started to develop psoriasis. Then established care with mahi bhakta here in our group, started on prednisone->methotrexate. Methotrexate was working, but was having side effects. Started on humira 40mg q2 weeks. This was working well, but started to take it every 3 weeks. He was lost to follow-up and because he was unable to get into our clinic he established with [facility]. Over the last year, with advice from [facility], went to once monthly with humira. However has not had an injection in 6 weeks, due to lack of appointment and inability to get this refilled. His joint symptoms are currently active. He has pain and stiffness in multiple sites, particularly the left shoulder, he is currently unable to abduct his left shoulder beyond 90 degrees. He has stiffness in his hands in the AM that resolves within 5-10 minutes. He has not history of dactylitis. No history of recurrent oral/nasal ulcers. No blood in the stool. No IBD symptoms. No interval or prior symptoms consistent with dactylitis. He was recently dx with sleep apnea, started wearing CPAP which has caused significant improvement in cognition. He is stiff again after being inactive for about 1 hour. 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 He has gained significant weight over the last 4-5 years. However during this time he has made positive lifestyle changes with both diet and exercise. He walks 3-4 miles per day. Eats a healthy diet  and still has gained weight. He questions whether this is related to humira, though discussed no data to support this.     14 point ROS collected and negative if not documented above.     PMH:  Asthma  Psoriatic arthritis    PSH none      Current Outpatient Medications   Medication    adalimumab (HUMIRA PEN) 40 MG/0.8ML pen kit    albuterol (PROAIR HFA, PROVENTIL HFA, VENTOLIN HFA) 108 (90 BASE) MCG/ACT inhaler    FLOVENT  MCG/ACT inhaler     No current facility-administered medications for this visit.     FH:  Psoriatic arthritis    SH:  . Has children. No smoking/drug use history. Rare ETOH. Chews tobacco.     Objective  /83   Pulse 62   Temp 97.8  F (36.6  C)   Wt 125.6 kg (277 lb)   SpO2 90%   BMI 40.91 kg/m    GEN: sitting up unassisted, NAD  HEENT: No facial rash, sclera clear, no oral or nasal ulcers,   Extremities: Full active and passive ROM of right shoulder. Unable to abduct the left shoulder above 90 degrees. Full flexion/extension of bilateral elbows, wrists, MCPs, PIPs, DIPs. Able to make a full fist bilaterally. No dactylitis. No synovitis of any joints. No digital nail pitting.  No sclerodactyly  Skin: No acute cutaneous changes. No active psoriatic plaques appreciated      WBC   Date Value Ref Range Status   02/17/2020 8.4 4.0 - 11.0 10e9/L Final     Hemoglobin   Date Value Ref Range Status   02/17/2020 15.5 13.3 - 17.7 g/dL Final     Platelet Count   Date Value Ref Range Status   02/17/2020 222 150 - 450 10e9/L Final     Creatinine   Date Value Ref Range Status   02/17/2020 1.15 0.66 - 1.25 mg/dL Final     Lab Results   Component Value Date    ALKPHOS 96 04/07/2017     AST   Date Value Ref Range Status   02/17/2020 32 0 - 45 U/L Final     Lab Results   Component Value Date    ALT 84 02/17/2020     Sed Rate   Date Value Ref Range Status   09/30/2016 9 0 - 15 mm/h Final     CRP Inflammation   Date Value Ref Range Status   02/18/2019 <2.9 0.0 - 8.0 mg/L Final     UA  RESULTS:  Recent Labs   Lab Test 04/25/18  1431   COLOR Yellow   APPEARANCE Clear   URINEGLC Negative   URINEBILI Negative   URINEKETONE Negative   SG 1.020   UBLD Negative   URINEPH 6.0   PROTEIN Negative   NITRITE Negative   LEUKEST Negative   RBCU <1   WBCU <1      CRISS by IFA IgG   Date Value Ref Range Status   09/30/2016   Final    <1:40  Reference range: <1:40  (Note)  <1:40  INTERPRETIVE INFORMATION: CRISS by IFA, IgG  Anti-nuclear antibodies (CRISS) are seen in a variety of  systemic rheumatic diseases and are determined by indirect  fluorescence assay (IFA) using HEp-2 substrate with an  IgG-specific conjugate. CRISS titers less than or equal to  1:80 have variable relevance while titers greater than or  equal to 1:160 are considered clinically significant. These  antibodies may precede clinical disease onset; however,  healthy individuals and those with advanced age have been  reported to be positive for CRISS. When observed, one of the  five basic patterns is reported: homogeneous,  peripheral/rim, speckled, centromere, or nucleolar. If  cytoplasmic fluorescence is observed, it is noted. IFA  methodology is subjective and has occasionally been shown  to lack sensitivity for anti-SSA/Ro antibodies.  Negative results do not necessarily rule out the presence  of SSc. If clinical suspicion remains, consider further  testing for U3-RNP, PM/Scl, or Th/To antibodies associated  with SSc.  Performed by [facility],  [address] [place] [id number] [phone]  [email or web address], Earnest Kelly MD, Lab. Director       Rheumatoid Factor   Date Value Ref Range Status   04/25/2018 <20 <20 IU/mL Final     Cyclic Citrullinated Peptide Antibody, IgG   Date Value Ref Range Status   04/25/2018 1 <7 U/mL Final     Comment:     Negative     #Psoriasis  #Psoriatic arthritis  #High risk medication use: humira  --Risks and benefits of TNF inhibitor discussed today to include infection, injection site reactions, anaphylaxis,  demyelinating disease, GI/hepatoxicity, bowel perforation, malignancy among others. Patient is agreeable    Leon has been out of humira for 6+ weeks. Has been taking it monthly for the last year or so. Discussed lack of data for this approach, particularly with his active symptoms with pain/stiffness.     Plan at this time, given his ongoing active disease, is to restart humira. However, will be using the FDA approved dose and interval of 40mg q2 weeks. He specifically requests NOT citrate free, have ordered this along with ordering/updating his Hep B core AB, hep B surface AB, HIV, quant gold today as has been 3 years since he has been seen in clinic. Will also update his CBC, AST, ALT, albumin, creatinine, ESR, CRP for baseline.     In re: to his weight gain, despite positive lifestyle changes/increased activity, will request endocrine evaluation and consideration of medication therapy which he is interested in.     PLAN:  -labs today to include CBC, AST, ALT, creatinine, ESR, CRP, Hep B core AB, Hep B surface AG, HIV, quant gold  -restart humira 40mg q2 weeks  -endocrine referral  -follow-up with me in 6 months. Will order labs again at that time     Mukul Gatica MD  Rheumatology    I spent a total of 55 minutes on the date of service on chart review, patient encounter, , documentation.

## 2023-07-13 NOTE — PATIENT INSTRUCTIONS
1) Labs today downstairs  2) if you hear nothing by Tuesday re: humira approval, let me know  3) follow-up in 6 months  4) expect phone call within 48 hours to schedule endocrine appointment

## 2023-07-13 NOTE — TELEPHONE ENCOUNTER
PA Initiation    Medication: HUMIRA PEN 40 MG/0.8ML SC PEN KIT  Insurance Company: [employer] ([facility]) - Phone [phone] Fax [phone]  Pharmacy Filling the Rx: [facility] - [place], [place] - [facility] [address]  Filling Pharmacy Phone:    Filling Pharmacy Fax:    Start Date: 7/5/2023      ALISHA LIND ([place]: [id number])

## 2023-07-13 NOTE — TELEPHONE ENCOUNTER
Prior Authorization Approval    Medication: HUMIRA PEN 40 MG/0.8ML SC PEN KIT  Authorization Effective Date: 7/13/2023  Authorization Expiration Date: 7/13/2024  Approved Dose/Quantity: 2 per 28  Reference #: [id number]   Insurance Company: [employer] ([facility]) - Phone [phone] Fax [phone]  Expected CoPay:       CoPay Card Available:     Which Pharmacy is filling the prescription: [facility] - [address]  Pharmacy Notified:    Patient Notified:

## 2023-07-13 NOTE — PROGRESS NOTES
[facility] - Rheumatology Clinic Visit   July 13, 2023 New Consult- re-establishing care after being last seen in August 2020    Leon Samuels  is a 42 year old follow-up psoriatic arthritis, psoriasis. Humira since . 4-2018 -RF -[facility]P - Factin - Mitochondrial  AST 47 -Hep B/c, MTB QG. Past Dr. Moncada      Past: Methotrexate (not fully effective, not helped with skin, head fuzzy) and never started the leflunomide. Prednisone, dramatic response    Copy forward  Initial visit 4-2018: Taking humira 40 mg injection every 2 weeks, tolerating well. No infections and asthma is controlled. Reports gained like 30 lbs since starting the humira. Dramatic improvement in control of psoriasis, inflammation (shoulders, knees, ankles, hips and back) with no pain and dramatic improved QoL. Before humira, not able to move or lift arms over head due to the pain in shoulder, walked like he was getting off a horse, major joints with swelling and changes in his nails---all resolved but when missed his dose (ran out all this started to come back in a few days). Mild symptoms few days before next dose due. Inflammatory back sx resolved. Some DDD lumbar spine but no radiculopathy and does not wish for PT. Snoring and wife thinks he has FAMILIA. Energy is fair. No s/sx DM. No dactylitis or tendonitis. Mild sx left 2nd MCP. No prednisone and only rare advil when due for humira (general LBP ache).     Copy forward  February 18, 2019  Last visit, elevated liver enzymes was to lose weight, avoid NSAID and alcohol and follow-up  With PCP which he did not do. He has gained weight. No asthma flares, nor any infections. Denies any fever, chills, SOB, ASHFORD, night sweats, or chest pain, or cough. Reports healthy. Up until a month ago (delay due to insurance, supplier side) was on adalimumab (humira) 40 mg injection every 2 weeks. He will restart this in a few days when this comes. Noticing more tendonitis in elbows, hand  "pain with stiffness, cramping, right outer hand cramps. Intermittent arthralgia in ankles, chronic intermittent but no swelling. Intermittent dactylitis in fingers. EAS in hands. Shoulders are good. Inflammatory back symptoms now off adalimumab (humira), pain in SI joints, sides of hips bother. No redflags nor neuro signs or symptoms. Never any issues in knees, toes. All this but the intermittent ankle tender resolves on the adalimumab (humira). Has been taking ibuprofen 1000 mg every other day-tolerating no side-effects  x 2 weeks due to not being on the adalimumab (humira). Energy is low as not working out and gaining weight. The lack of adalimumab (humira) is affecting his arthritis making his job harder. Skin and nails are good. He knows he has a bad lower back, but feels better on the adalimumab (humira) but aware of the symptoms or signs to look for if not improves or changes. No other changes to Psychiatric     August 24, 2020  Last seen 2-2020-no changes, recommended follow-up PCP with elevated ALT 84, reduced alcohol and weight.     Psoriatic arthritis and psoriasis is controlled. He did takes adalimumab (humira) every 3 week, mild stiffness and tight in fingers, sore in MCPs and PIP day before, but doing welll. Psoriasis 100% gone. No EAS, no flares or loss of ROM, dacytlitis, sausage fingers, or changes in appearance of joints. Good fist.     Lower energy, \"not like asthma\" more like out of shape. No chest pain, sweating or indigestions, or flu-like syndrome. Denies any fever, chills, SOB, ASHFORD, night sweats, or chest pain, high fever, cough, travel in last 14 days or exposure to covid-19 (coronavirus). Reports healthy. No diabetes like symptoms.       Continues adalimumab (humira) 40 mg injection every 21 days. Tolerates well no side-effects     7/13/2023  - Leon presents for follow-up today after not having been seen since August 24, 2020.    - roughly 2016/2017 went to chiropractor/PT/steroid injection. " Specifically back was locking up, biggest issue. Peripheral joints were also a problem, but not as much as the back. Not making substantial difference. Then started to develop psoriasis. Then established care with mahi bhakta here in our group, started on prednisone->methotrexate. Methotrexate was working, but was having side effects. Started on humira 40mg q2 weeks. This was working well, but started to take it every 3 weeks. He was lost to follow-up and because he was unable to get into our clinic he established with [facility]. Over the last year, with advice from [facility], went to once monthly with humira. However has not had an injection in 6 weeks, due to lack of appointment and inability to get this refilled. His joint symptoms are currently active. He has pain and stiffness in multiple sites, particularly the left shoulder, he is currently unable to abduct his left shoulder beyond 90 degrees. He has stiffness in his hands in the AM that resolves within 5-10 minutes. He has not history of dactylitis. No history of recurrent oral/nasal ulcers. No blood in the stool. No IBD symptoms. No interval or prior symptoms consistent with dactylitis. He was recently dx with sleep apnea, started wearing CPAP which has caused significant improvement in cognition. He is stiff again after being inactive for about 1 hour. 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 He has gained significant weight over the last 4-5 years. However during this time he has made positive lifestyle changes with both diet and exercise. He walks 3-4 miles per day. Eats a healthy diet and still has gained weight. He questions whether this is related to humira, though discussed no data to support this.     14 point ROS collected and negative if not documented above.     PMH:  Asthma  Psoriatic arthritis    PSH none      Current Outpatient Medications   Medication     adalimumab (HUMIRA PEN) 40 MG/0.8ML pen  kit     albuterol (PROAIR HFA, PROVENTIL HFA, VENTOLIN HFA) 108 (90 BASE) MCG/ACT inhaler     FLOVENT  MCG/ACT inhaler     No current facility-administered medications for this visit.     FH:  Psoriatic arthritis    SH:  . Has children. No smoking/drug use history. Rare ETOH. Chews tobacco.     Objective  /83   Pulse 62   Temp 97.8  F (36.6  C)   Wt 125.6 kg (277 lb)   SpO2 90%   BMI 40.91 kg/m    GEN: sitting up unassisted, NAD  HEENT: No facial rash, sclera clear, no oral or nasal ulcers,   Extremities: Full active and passive ROM of right shoulder. Unable to abduct the left shoulder above 90 degrees. Full flexion/extension of bilateral elbows, wrists, MCPs, PIPs, DIPs. Able to make a full fist bilaterally. No dactylitis. No synovitis of any joints. No digital nail pitting.  No sclerodactyly  Skin: No acute cutaneous changes. No active psoriatic plaques appreciated      WBC   Date Value Ref Range Status   02/17/2020 8.4 4.0 - 11.0 10e9/L Final     Hemoglobin   Date Value Ref Range Status   02/17/2020 15.5 13.3 - 17.7 g/dL Final     Platelet Count   Date Value Ref Range Status   02/17/2020 222 150 - 450 10e9/L Final     Creatinine   Date Value Ref Range Status   02/17/2020 1.15 0.66 - 1.25 mg/dL Final     Lab Results   Component Value Date    ALKPHOS 96 04/07/2017     AST   Date Value Ref Range Status   02/17/2020 32 0 - 45 U/L Final     Lab Results   Component Value Date    ALT 84 02/17/2020     Sed Rate   Date Value Ref Range Status   09/30/2016 9 0 - 15 mm/h Final     CRP Inflammation   Date Value Ref Range Status   02/18/2019 <2.9 0.0 - 8.0 mg/L Final     UA RESULTS:  Recent Labs   Lab Test 04/25/18  1431   COLOR Yellow   APPEARANCE Clear   URINEGLC Negative   URINEBILI Negative   URINEKETONE Negative   SG 1.020   UBLD Negative   URINEPH 6.0   PROTEIN Negative   NITRITE Negative   LEUKEST Negative   RBCU <1   WBCU <1      CRISS by IFA IgG   Date Value Ref Range Status   09/30/2016   Final     <1:40  Reference range: <1:40  (Note)  <1:40  INTERPRETIVE INFORMATION: CRISS by IFA, IgG  Anti-nuclear antibodies (CRISS) are seen in a variety of  systemic rheumatic diseases and are determined by indirect  fluorescence assay (IFA) using HEp-2 substrate with an  IgG-specific conjugate. CRISS titers less than or equal to  1:80 have variable relevance while titers greater than or  equal to 1:160 are considered clinically significant. These  antibodies may precede clinical disease onset; however,  healthy individuals and those with advanced age have been  reported to be positive for CRISS. When observed, one of the  five basic patterns is reported: homogeneous,  peripheral/rim, speckled, centromere, or nucleolar. If  cytoplasmic fluorescence is observed, it is noted. IFA  methodology is subjective and has occasionally been shown  to lack sensitivity for anti-SSA/Ro antibodies.  Negative results do not necessarily rule out the presence  of SSc. If clinical suspicion remains, consider further  testing for U3-RNP, PM/Scl, or Th/To antibodies associated  with SSc.  Performed by [facility],  [address] [place] [id number] [phone]  [email or web address], Earnest Kelly MD, Lab. Director       Rheumatoid Factor   Date Value Ref Range Status   04/25/2018 <20 <20 IU/mL Final     Cyclic Citrullinated Peptide Antibody, IgG   Date Value Ref Range Status   04/25/2018 1 <7 U/mL Final     Comment:     Negative     #Psoriasis  #Psoriatic arthritis  #High risk medication use: humira  --Risks and benefits of TNF inhibitor discussed today to include infection, injection site reactions, anaphylaxis, demyelinating disease, GI/hepatoxicity, bowel perforation, malignancy among others. Patient is agreeable    Leon has been out of humira for 6+ weeks. Has been taking it monthly for the last year or so. Discussed lack of data for this approach, particularly with his active symptoms with pain/stiffness.     Plan at this time, given his  ongoing active disease, is to restart humira. However, will be using the FDA approved dose and interval of 40mg q2 weeks. He specifically requests NOT citrate free, have ordered this along with ordering/updating his Hep B core AB, hep B surface AB, HIV, quant gold today as has been 3 years since he has been seen in clinic. Will also update his CBC, AST, ALT, albumin, creatinine, ESR, CRP for baseline.     In re: to his weight gain, despite positive lifestyle changes/increased activity, will request endocrine evaluation and consideration of medication therapy which he is interested in.     PLAN:  -labs today to include CBC, AST, ALT, creatinine, ESR, CRP, Hep B core AB, Hep B surface AG, HIV, quant gold  -restart humira 40mg q2 weeks  -endocrine referral  -follow-up with me in 6 months. Will order labs again at that time     Mukul Gatica MD  Rheumatology    I spent a total of 55 minutes on the date of service on chart review, patient encounter, , documentation.

## 2023-07-13 NOTE — NURSING NOTE
Chief Complaint   Patient presents with     Consult     New pt consultant.     Blood pressure 132/83, pulse 62, temperature 97.8  F (36.6  C), weight 125.6 kg (277 lb), SpO2 90 %.    GAVI QUINTANA

## 2023-07-15 LAB
GAMMA INTERFERON BACKGROUND BLD IA-ACNC: 0.18 IU/ML
M TB IFN-G BLD-IMP: NEGATIVE
M TB IFN-G CD4+ BCKGRND COR BLD-ACNC: 9.82 IU/ML
MITOGEN IGNF BCKGRD COR BLD-ACNC: 0.01 IU/ML
MITOGEN IGNF BCKGRD COR BLD-ACNC: 0.2 IU/ML
QUANTIFERON MITOGEN: 10 IU/ML
QUANTIFERON NIL TUBE: 0.18 IU/ML
QUANTIFERON TB1 TUBE: 0.19 IU/ML
QUANTIFERON TB2 TUBE: 0.38

## 2023-12-04 ENCOUNTER — TELEPHONE (OUTPATIENT)
Dept: RHEUMATOLOGY | Facility: CLINIC | Age: 43
End: 2023-12-04
Payer: COMMERCIAL

## 2024-01-25 ENCOUNTER — TELEPHONE (OUTPATIENT)
Dept: RHEUMATOLOGY | Facility: CLINIC | Age: 44
End: 2024-01-25
Payer: COMMERCIAL

## 2024-01-25 NOTE — PROGRESS NOTES
Virtual Visit Details    Type of service:  Video Visit     Originating Location (pt. Location): Home    Distant Location (provider location):  On-site  Platform used for Video Visit: [facility]    Rheumatology Clinic Visit  Vitaliy Corea M.D.     Leon Samuels MRN# [id number]   YOB: 1980 Age: 43 year old     Date of Visit: 01/26/2024  Primary care provider: Lilliana Bailey    Assessment/Plan  # Psoriasis  # Psoriatic arthritis  # High risk medication use: humira; less than 2 times the upper limit of normal AST/ALT elevation noted July 2023.  # Left shoulder pain, impingement syndrome    Patient reports mild, intermittent finger pain, minimal morning stiffness, and no psoriasis.  Video exam shows full painless range of motion in hands and wrists today.  Forward elevation and abduction of the left shoulder elicits pain over the upper outer arm.    Data: In July 2023, creatinine, albumin, CRP, and CBC were all negative or normal.  Sedimentation rate was 9.  ALT was elevated 97 and AST at 53, comparable to values that had been observed in 2018 and in 2019.    Discussion: Psoriasis with inflammatory arthropathy is significantly better suppressed than prior to resuming Humira therapy.  Patient has minimal Humira dosing cycle dependent symptoms.  Left shoulder pain is likely due to a local soft tissue issue such as subacromial bursitis or rotator cuff tendinitis.  I recommend investigating with plain x-ray of the shoulder, trial of physical therapy for range of motion.  If physical therapy is not effective after 6 to 8 weeks, would offer subacromial injection.    Risks and benefits of TNF inhibitor discussed today to include infection, injection site reactions, anaphylaxis, demyelinating disease, GI/hepatoxicity, bowel perforation, malignancy among others. Patient is agreeable    Plan:  1.  X-ray, left shoulder  2.  Repeat blood work, including liver function tests.  3.  Continue adalimumab 40 mg  "iftikharu every 21 days.  4.  Physical therapy course for impingement syndrome, left shoulder; injection or more aggressive therapy may be needed if physical therapy does not help in 2 months    Vitaliy Corea MD  Staff Rheumatologist, [facility]    On the day of the encounter, a total of 50 minutes was spent in chart review, and in counseling and coordination of care, regarding the patient's complex medical problem of psoriatic arthritis, left shoulder pain, elevated liver function test.      HPI: Patient presents for follow-up of psoriatic arthritis and transfer of care from Dr. Gatica.  He was last seen by Dr. Gatica in July 2023.  At that time, psoriatic arthritis with polyarthritis was active.  Recommendation was to restart Humira with a frequency of every 2-week dosing.  Endocrinology evaluation was ordered for chronic weight gain.    Dx: psoriatic arthritis, psoriasis. Humira since . 4-2018 -RF -CCP - Factin - Mitochondrial  AST 47 -Hep B/c, MTB QG. Past Dr. Moncada  Past: Methotrexate (not fully effective, not helped with skin, head fuzzy) and never started the leflunomide. Prednisone, dramatic response    Interval history January 26, 2024    After last visit, he started humira.   He reports overall skin and joints are better.  L shoulder is gradually worsening \"bearable\" but annoying. Worsening over the last 6 months. Hurts at night.  Hands are getting weaker.   There is swelling/tightness in the finger joints waxing and waning  Activity does not worsen joitn pain.   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    Skin has been in \"good shape\"--one small patch under his beard.  Injects humira every 3 weeks. He dose note increased joint pain for 1-2 days before scheduled dose.  Chagnes in weather correlate with increased pain.    He started prednisone for pink-eye 2 days ago; joints are better since then.        Interval history with Dr." En July 13, 2023  Leon presents for follow-up today after not having been seen since August 24, 2020.    roughly 2016/2017 went to chiropractor/PT/steroid injection. Specifically back was locking up, biggest issue. Peripheral joints were also a problem, but not as much as the back. Not making substantial difference. Then started to develop psoriasis. Then established care with mahi bhakta here in our group, started on prednisone->methotrexate. Methotrexate was working, but was having side effects. Started on humira 40mg q2 weeks. This was working well, but started to take it every 3 weeks. He was lost to follow-up and because he was unable to get into our clinic he established with [facility]. Over the last year, with advice from [facility], went to once monthly with humira. However has not had an injection in 6 weeks, due to lack of appointment and inability to get this refilled. His joint symptoms are currently active. He has pain and stiffness in multiple sites, particularly the left shoulder, he is currently unable to abduct his left shoulder beyond 90 degrees. He has stiffness in his hands in the AM that resolves within 5-10 minutes. He has not history of dactylitis. No history of recurrent oral/nasal ulcers. No blood in the stool. No IBD symptoms. No interval or prior symptoms consistent with dactylitis. He was recently dx with sleep apnea, started wearing CPAP which has caused significant improvement in cognition. He is stiff again after being inactive for about 1 hour. 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 He has gained significant weight over the last 4-5 years. However during this time he has made positive lifestyle changes with both diet and exercise. He walks 3-4 miles per day. Eats a healthy diet and still has gained weight. He questions whether this is related to humira, though discussed no data to support this.     Copy forward  Initial visit  4-2018: Taking humira 40 mg injection every 2 weeks, tolerating well. No infections and asthma is controlled. Reports gained like 30 lbs since starting the humira. Dramatic improvement in control of psoriasis, inflammation (shoulders, knees, ankles, hips and back) with no pain and dramatic improved QoL. Before humira, not able to move or lift arms over head due to the pain in shoulder, walked like he was getting off a horse, major joints with swelling and changes in his nails---all resolved but when missed his dose (ran out all this started to come back in a few days). Mild symptoms few days before next dose due. Inflammatory back sx resolved. Some DDD lumbar spine but no radiculopathy and does not wish for PT. Snoring and wife thinks he has FAMILIA. Energy is fair. No s/sx DM. No dactylitis or tendonitis. Mild sx left 2nd MCP. No prednisone and only rare advil when due for humira (general LBP ache).     Copy forward  February 18, 2019  Last visit, elevated liver enzymes was to lose weight, avoid NSAID and alcohol and follow-up  With PCP which he did not do. He has gained weight. No asthma flares, nor any infections. Denies any fever, chills, SOB, ASHFORD, night sweats, or chest pain, or cough. Reports healthy. Up until a month ago (delay due to insurance, supplier side) was on adalimumab (humira) 40 mg injection every 2 weeks. He will restart this in a few days when this comes. Noticing more tendonitis in elbows, hand pain with stiffness, cramping, right outer hand cramps. Intermittent arthralgia in ankles, chronic intermittent but no swelling. Intermittent dactylitis in fingers. EAS in hands. Shoulders are good. Inflammatory back symptoms now off adalimumab (humira), pain in SI joints, sides of hips bother. No redflags nor neuro signs or symptoms. Never any issues in knees, toes. All this but the intermittent ankle tender resolves on the adalimumab (humira). Has been taking ibuprofen 1000 mg every other day-tolerating no  "side-effects  x 2 weeks due to not being on the adalimumab (humira). Energy is low as not working out and gaining weight. The lack of adalimumab (humira) is affecting his arthritis making his job harder. Skin and nails are good. He knows he has a bad lower back, but feels better on the adalimumab (humira) but aware of the symptoms or signs to look for if not improves or changes. No other changes to [facility]     August 24, 2020  Last seen 2-2020-no changes, recommended follow-up PCP with elevated ALT 84, reduced alcohol and weight.     Psoriatic arthritis and psoriasis is controlled. He did takes adalimumab (humira) every 3 week, mild stiffness and tight in fingers, sore in MCPs and PIP day before, but doing welll. Psoriasis 100% gone. No EAS, no flares or loss of ROM, dacytlitis, sausage fingers, or changes in appearance of joints. Good fist.     Lower energy, \"not like asthma\" more like out of shape. No chest pain, sweating or indigestions, or flu-like syndrome. Denies any fever, chills, SOB, ASHFORD, night sweats, or chest pain, high fever, cough, travel in last 14 days or exposure to covid-19 (coronavirus). Reports healthy. No diabetes like symptoms.       Continues adalimumab (humira) 40 mg injection every 21 days. Tolerates well no side-effects       14 point ROS collected and negative if not documented above.     PMH:  Asthma  Psoriatic arthritis    PSH none    Current Outpatient Medications   Medication Sig    adalimumab (HUMIRA PEN) 40 MG/0.8ML pen kit Inject 0.4 ml every 21 days.    adalimumab (HUMIRA PEN) 40 MG/0.8ML pen kit Inject 0.8 mLs (40 mg) Subcutaneous every 14 days Hold for signs of infection, then seek medical attention.    albuterol (PROAIR HFA, PROVENTIL HFA, VENTOLIN HFA) 108 (90 BASE) MCG/ACT inhaler Inhale 2 puffs into the lungs every 6 hours    amoxicillin-clavulanate (AUGMENTIN) 875-125 MG tablet TAKE 1 TABLET BY MOUTH EVERY 12 HOURS FOR 7 DAYS WITH FOOD FOR SINUS INFECTION    EPINEPHrine (ANY BX " "GENERIC EQUIV) 0.3 MG/0.3ML injection 2-pack Inject 0.3 mLs (0.3 mg) into the muscle as needed for anaphylaxis May repeat one time in 5-15 minutes if response to initial dose is inadequate.    erythromycin (ROMYCIN) 5 MG/GM ophthalmic ointment Place into both eyes 2 times daily    predniSONE (DELTASONE) 20 MG tablet Take 40 mg by mouth daily take 2 tablets by mouth daily for 5 days    predniSONE (DELTASONE) 5 MG tablet Take 3 tabs daily for 1 week, then take 2 tabs daily for 1 week    FLOVENT  MCG/ACT inhaler Inhale 2 puffs into the lungs 2 times daily (Patient not taking: Reported on 7/13/2023)     No current facility-administered medications for this visit.         No current facility-administered medications for this visit.     FH:  Psoriatic arthritis    SH:  . Has children. No smoking/drug use history. Rare ETOH. Chews tobacco.     Objective  Ht 1.753 m (5' 9\")   Wt 117.9 kg (260 lb)   BMI 38.40 kg/m    GEN: sitting up unassisted, NAD  HEENT: No facial rash, sclera clear, no oral or nasal ulcers,   Extremities: Full range of motion of hands and wrists; painful left shoulder forward flexion and abduction, external rotation.  Skin: No acute cutaneous changes. No active psoriatic plaques appreciated      WBC   Date Value Ref Range Status   02/17/2020 8.4 4.0 - 11.0 10e9/L Final     WBC Count   Date Value Ref Range Status   07/13/2023 6.6 4.0 - 11.0 10e3/uL Final     Hemoglobin   Date Value Ref Range Status   07/13/2023 15.7 13.3 - 17.7 g/dL Final   02/17/2020 15.5 13.3 - 17.7 g/dL Final     Platelet Count   Date Value Ref Range Status   07/13/2023 187 150 - 450 10e3/uL Final   02/17/2020 222 150 - 450 10e9/L Final     Creatinine   Date Value Ref Range Status   07/13/2023 1.15 0.67 - 1.17 mg/dL Final   02/17/2020 1.15 0.66 - 1.25 mg/dL Final     Lab Results   Component Value Date    ALKPHOS 96 04/07/2017     AST   Date Value Ref Range Status   07/13/2023 53 (H) 0 - 45 U/L Final     Comment:     " Reference intervals for this test were updated on 6/12/2023 to more accurately reflect our healthy population. There may be differences in the flagging of prior results with similar values performed with this method. Interpretation of those prior results can be made in the context of the updated reference intervals.   02/17/2020 32 0 - 45 U/L Final     Lab Results   Component Value Date    ALT 84 02/17/2020     Sed Rate   Date Value Ref Range Status   09/30/2016 9 0 - 15 mm/h Final     Erythrocyte Sedimentation Rate   Date Value Ref Range Status   07/13/2023 9 0 - 15 mm/hr Final     CRP Inflammation   Date Value Ref Range Status   02/18/2019 <2.9 0.0 - 8.0 mg/L Final     UA RESULTS:  Recent Labs   Lab Test 04/25/18  1431   COLOR Yellow   APPEARANCE Clear   URINEGLC Negative   URINEBILI Negative   URINEKETONE Negative   SG 1.020   UBLD Negative   URINEPH 6.0   PROTEIN Negative   NITRITE Negative   LEUKEST Negative   RBCU <1   WBCU <1      CRISS by IFA IgG   Date Value Ref Range Status   09/30/2016   Final    <1:40  Reference range: <1:40  (Note)  <1:40  INTERPRETIVE INFORMATION: CRISS by IFA, IgG  Anti-nuclear antibodies (CRISS) are seen in a variety of  systemic rheumatic diseases and are determined by indirect  fluorescence assay (IFA) using HEp-2 substrate with an  IgG-specific conjugate. CRISS titers less than or equal to  1:80 have variable relevance while titers greater than or  equal to 1:160 are considered clinically significant. These  antibodies may precede clinical disease onset; however,  healthy individuals and those with advanced age have been  reported to be positive for CRISS. When observed, one of the  five basic patterns is reported: homogeneous,  peripheral/rim, speckled, centromere, or nucleolar. If  cytoplasmic fluorescence is observed, it is noted. IFA  methodology is subjective and has occasionally been shown  to lack sensitivity for anti-SSA/Ro antibodies.  Negative results do not necessarily rule out  the presence  of SSc. If clinical suspicion remains, consider further  testing for U3-RNP, PM/Scl, or Th/To antibodies associated  with SSc.  Performed by [facility],  [address] [place] [address],[place] [address] [phone]  [email or web address], Earnest Kelly MD, Lab. Director       Rheumatoid Factor   Date Value Ref Range Status   04/25/2018 <20 <20 IU/mL Final     Cyclic Citrullinated Peptide Antibody, IgG   Date Value Ref Range Status   04/25/2018 1 <7 U/mL Final     Comment:     Negative

## 2024-01-25 NOTE — TELEPHONE ENCOUNTER
[facility] Call Center    Phone Message    May a detailed message be left on voicemail: yes     Reason for Call: Other: Pt is calling to see if he can switch his visit with Dr Corea to a video. Pt woke up with double pink eye and cannot come in. Would it be ok to switch today's visit to a video??     Please call pt back at [phone].     Action Taken: Message routed to:  [facility] ([facility] Rheum    Travel Screening: Not Applicable

## 2024-01-25 NOTE — TELEPHONE ENCOUNTER
Returned call to patient. Patient will be in the state of [place]. Patient stated he is stable and would appreciate a video visit.    ERASMO LizarragaN, RN  RN Care Coordinator Rheumatology

## 2024-01-26 ENCOUNTER — VIRTUAL VISIT (OUTPATIENT)
Dept: RHEUMATOLOGY | Facility: CLINIC | Age: 44
End: 2024-01-26
Attending: INTERNAL MEDICINE
Payer: COMMERCIAL

## 2024-01-26 VITALS — HEIGHT: 69 IN | BODY MASS INDEX: 38.51 KG/M2 | WEIGHT: 260 LBS

## 2024-01-26 DIAGNOSIS — G89.29 CHRONIC LEFT SHOULDER PAIN: ICD-10-CM

## 2024-01-26 DIAGNOSIS — L40.50 PSORIATIC ARTHRITIS (H): Primary | ICD-10-CM

## 2024-01-26 DIAGNOSIS — M25.512 CHRONIC LEFT SHOULDER PAIN: ICD-10-CM

## 2024-01-26 PROCEDURE — 99215 OFFICE O/P EST HI 40 MIN: CPT | Mod: 95 | Performed by: INTERNAL MEDICINE

## 2024-01-26 RX ORDER — PREDNISONE 20 MG/1
40 TABLET ORAL DAILY
COMMUNITY

## 2024-01-26 RX ORDER — PREDNISONE 5 MG/1
TABLET ORAL
Qty: 35 TABLET | Refills: 2 | Status: SHIPPED | OUTPATIENT
Start: 2024-01-26

## 2024-01-26 RX ORDER — ERYTHROMYCIN 5 MG/G
OINTMENT OPHTHALMIC 2 TIMES DAILY
COMMUNITY
Start: 2024-01-24

## 2024-01-26 ASSESSMENT — PAIN SCALES - GENERAL: PAINLEVEL: NO PAIN (0)

## 2024-01-26 NOTE — LETTER
1/26/2024       RE: Leon Samuels  [id number] [place] [address]     Dear Colleague,    Thank you for referring your patient, Leon Samuels, to the [facility] RHEUMATOLOGY CLINIC [place] at [facility]. Please see a copy of my visit note below.      Rheumatology Clinic Visit  Vitaliy Corea M.D.     Leon Samuels MRN# [id number]   YOB: 1980 Age: 43 year old     Date of Visit: 01/26/2024  Primary care provider: Lilliana Bailey    Assessment/Plan  # Psoriasis  # Psoriatic arthritis  # High risk medication use: humira; less than 2 times the upper limit of normal AST/ALT elevation noted July 2023.  # Left shoulder pain, impingement syndrome    Patient reports mild, intermittent finger pain, minimal morning stiffness, and no psoriasis.  Video exam shows full painless range of motion in hands and wrists today.  Forward elevation and abduction of the left shoulder elicits pain over the upper outer arm.    Data: In July 2023, creatinine, albumin, CRP, and CBC were all negative or normal.  Sedimentation rate was 9.  ALT was elevated 97 and AST at 53, comparable to values that had been observed in 2018 and in 2019.    Discussion: Psoriasis with inflammatory arthropathy is significantly better suppressed than prior to resuming Humira therapy.  Patient has minimal Humira dosing cycle dependent symptoms.  Left shoulder pain is likely due to a local soft tissue issue such as subacromial bursitis or rotator cuff tendinitis.  I recommend investigating with plain x-ray of the shoulder, trial of physical therapy for range of motion.  If physical therapy is not effective after 6 to 8 weeks, would offer subacromial injection.    Risks and benefits of TNF inhibitor discussed today to include infection, injection site reactions, anaphylaxis, demyelinating disease, GI/hepatoxicity, bowel perforation, malignancy among others. Patient is  "agreeable    Plan:  1.  X-ray, left shoulder  2.  Repeat blood work, including liver function tests.  3.  Continue adalimumab 40 mg subcu every 21 days.  4.  Physical therapy course for impingement syndrome, left shoulder; injection or more aggressive therapy may be needed if physical therapy does not help in 2 months    Vitaliy Corea MD  Staff Rheumatologist, [facility]    On the day of the encounter, a total of 50 minutes was spent in chart review, and in counseling and coordination of care, regarding the patient's complex medical problem of psoriatic arthritis, left shoulder pain, elevated liver function test.      HPI: Patient presents for follow-up of psoriatic arthritis and transfer of care from Dr. Gatica.  He was last seen by Dr. Gatica in July 2023.  At that time, psoriatic arthritis with polyarthritis was active.  Recommendation was to restart Humira with a frequency of every 2-week dosing.  Endocrinology evaluation was ordered for chronic weight gain.    Dx: psoriatic arthritis, psoriasis. Humira since . 4-2018 -RF -CCP - Factin - Mitochondrial  AST 47 -Hep B/c, MTB QG. Past Dr. Moncada  Past: Methotrexate (not fully effective, not helped with skin, head fuzzy) and never started the leflunomide. Prednisone, dramatic response    Interval history January 26, 2024    After last visit, he started humira.   He reports overall skin and joints are better.  L shoulder is gradually worsening \"bearable\" but annoying. Worsening over the last 6 months. Hurts at night.  Hands are getting weaker.   There is swelling/tightness in the finger joints waxing and waning  Activity does not worsen joitn pain.   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    Skin has been in \"good shape\"--one small patch under his beard.  Injects humira every 3 weeks. He dose note increased joint pain for 1-2 days before scheduled dose.  Chagnes in weather correlate with " increased pain.    He started prednisone for pink-eye 2 days ago; joints are better since then.        Interval history with Dr. Gatica July 13, 2023  Leon presents for follow-up today after not having been seen since August 24, 2020.    roughly 2016/2017 went to chiropractor/PT/steroid injection. Specifically back was locking up, biggest issue. Peripheral joints were also a problem, but not as much as the back. Not making substantial difference. Then started to develop psoriasis. Then established care with mahi bhakta here in our group, started on prednisone->methotrexate. Methotrexate was working, but was having side effects. Started on humira 40mg q2 weeks. This was working well, but started to take it every 3 weeks. He was lost to follow-up and because he was unable to get into our clinic he established with [facility]. Over the last year, with advice from [facility], went to once monthly with humira. However has not had an injection in 6 weeks, due to lack of appointment and inability to get this refilled. His joint symptoms are currently active. He has pain and stiffness in multiple sites, particularly the left shoulder, he is currently unable to abduct his left shoulder beyond 90 degrees. He has stiffness in his hands in the AM that resolves within 5-10 minutes. He has not history of dactylitis. No history of recurrent oral/nasal ulcers. No blood in the stool. No IBD symptoms. No interval or prior symptoms consistent with dactylitis. He was recently dx with sleep apnea, started wearing CPAP which has caused significant improvement in cognition. He is stiff again after being inactive for about 1 hour. 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 He has gained significant weight over the last 4-5 years. However during this time he has made positive lifestyle changes with both diet and exercise. He walks 3-4 miles per day. Eats a healthy diet and still has gained  weight. He questions whether this is related to humira, though discussed no data to support this.     Copy forward  Initial visit 4-2018: Taking humira 40 mg injection every 2 weeks, tolerating well. No infections and asthma is controlled. Reports gained like 30 lbs since starting the humira. Dramatic improvement in control of psoriasis, inflammation (shoulders, knees, ankles, hips and back) with no pain and dramatic improved QoL. Before humira, not able to move or lift arms over head due to the pain in shoulder, walked like he was getting off a horse, major joints with swelling and changes in his nails---all resolved but when missed his dose (ran out all this started to come back in a few days). Mild symptoms few days before next dose due. Inflammatory back sx resolved. Some DDD lumbar spine but no radiculopathy and does not wish for PT. Snoring and wife thinks he has FAMILIA. Energy is fair. No s/sx DM. No dactylitis or tendonitis. Mild sx left 2nd MCP. No prednisone and only rare advil when due for humira (general LBP ache).     Copy forward  February 18, 2019  Last visit, elevated liver enzymes was to lose weight, avoid NSAID and alcohol and follow-up  With PCP which he did not do. He has gained weight. No asthma flares, nor any infections. Denies any fever, chills, SOB, ASHFORD, night sweats, or chest pain, or cough. Reports healthy. Up until a month ago (delay due to insurance, supplier side) was on adalimumab (humira) 40 mg injection every 2 weeks. He will restart this in a few days when this comes. Noticing more tendonitis in elbows, hand pain with stiffness, cramping, right outer hand cramps. Intermittent arthralgia in ankles, chronic intermittent but no swelling. Intermittent dactylitis in fingers. EAS in hands. Shoulders are good. Inflammatory back symptoms now off adalimumab (humira), pain in SI joints, sides of hips bother. No redflags nor neuro signs or symptoms. Never any issues in knees, toes. All this but  "the intermittent ankle tender resolves on the adalimumab (humira). Has been taking ibuprofen 1000 mg every other day-tolerating no side-effects  x 2 weeks due to not being on the adalimumab (humira). Energy is low as not working out and gaining weight. The lack of adalimumab (humira) is affecting his arthritis making his job harder. Skin and nails are good. He knows he has a bad lower back, but feels better on the adalimumab (humira) but aware of the symptoms or signs to look for if not improves or changes. No other changes to [facility]     August 24, 2020  Last seen 2-2020-no changes, recommended follow-up PCP with elevated ALT 84, reduced alcohol and weight.     Psoriatic arthritis and psoriasis is controlled. He did takes adalimumab (humira) every 3 week, mild stiffness and tight in fingers, sore in MCPs and PIP day before, but doing welll. Psoriasis 100% gone. No EAS, no flares or loss of ROM, dacytlitis, sausage fingers, or changes in appearance of joints. Good fist.     Lower energy, \"not like asthma\" more like out of shape. No chest pain, sweating or indigestions, or flu-like syndrome. Denies any fever, chills, SOB, ASHFORD, night sweats, or chest pain, high fever, cough, travel in last 14 days or exposure to covid-19 (coronavirus). Reports healthy. No diabetes like symptoms.       Continues adalimumab (humira) 40 mg injection every 21 days. Tolerates well no side-effects       14 point ROS collected and negative if not documented above.     PMH:  Asthma  Psoriatic arthritis    PSH none    Current Outpatient Medications   Medication Sig    adalimumab (HUMIRA PEN) 40 MG/0.8ML pen kit Inject 0.4 ml every 21 days.    adalimumab (HUMIRA PEN) 40 MG/0.8ML pen kit Inject 0.8 mLs (40 mg) Subcutaneous every 14 days Hold for signs of infection, then seek medical attention.    albuterol (PROAIR HFA, PROVENTIL HFA, VENTOLIN HFA) 108 (90 BASE) MCG/ACT inhaler Inhale 2 puffs into the lungs every 6 hours    amoxicillin-clavulanate " "(AUGMENTIN) 875-125 MG tablet TAKE 1 TABLET BY MOUTH EVERY 12 HOURS FOR 7 DAYS WITH FOOD FOR SINUS INFECTION    EPINEPHrine (ANY BX GENERIC EQUIV) 0.3 MG/0.3ML injection 2-pack Inject 0.3 mLs (0.3 mg) into the muscle as needed for anaphylaxis May repeat one time in 5-15 minutes if response to initial dose is inadequate.    erythromycin (ROMYCIN) 5 MG/GM ophthalmic ointment Place into both eyes 2 times daily    predniSONE (DELTASONE) 20 MG tablet Take 40 mg by mouth daily take 2 tablets by mouth daily for 5 days    predniSONE (DELTASONE) 5 MG tablet Take 3 tabs daily for 1 week, then take 2 tabs daily for 1 week    FLOVENT  MCG/ACT inhaler Inhale 2 puffs into the lungs 2 times daily (Patient not taking: Reported on 7/13/2023)     No current facility-administered medications for this visit.         No current facility-administered medications for this visit.     FH:  Psoriatic arthritis    SH:  . Has children. No smoking/drug use history. Rare ETOH. Chews tobacco.     Objective  Ht 1.753 m (5' 9\")   Wt 117.9 kg (260 lb)   BMI 38.40 kg/m    GEN: sitting up unassisted, NAD  HEENT: No facial rash, sclera clear, no oral or nasal ulcers,   Extremities: Full range of motion of hands and wrists; painful left shoulder forward flexion and abduction, external rotation.  Skin: No acute cutaneous changes. No active psoriatic plaques appreciated      WBC   Date Value Ref Range Status   02/17/2020 8.4 4.0 - 11.0 10e9/L Final     WBC Count   Date Value Ref Range Status   07/13/2023 6.6 4.0 - 11.0 10e3/uL Final     Hemoglobin   Date Value Ref Range Status   07/13/2023 15.7 13.3 - 17.7 g/dL Final   02/17/2020 15.5 13.3 - 17.7 g/dL Final     Platelet Count   Date Value Ref Range Status   07/13/2023 187 150 - 450 10e3/uL Final   02/17/2020 222 150 - 450 10e9/L Final     Creatinine   Date Value Ref Range Status   07/13/2023 1.15 0.67 - 1.17 mg/dL Final   02/17/2020 1.15 0.66 - 1.25 mg/dL Final     Lab Results   Component " Value Date    ALKPHOS 96 04/07/2017     AST   Date Value Ref Range Status   07/13/2023 53 (H) 0 - 45 U/L Final     Comment:     Reference intervals for this test were updated on 6/12/2023 to more accurately reflect our healthy population. There may be differences in the flagging of prior results with similar values performed with this method. Interpretation of those prior results can be made in the context of the updated reference intervals.   02/17/2020 32 0 - 45 U/L Final     Lab Results   Component Value Date    ALT 84 02/17/2020     Sed Rate   Date Value Ref Range Status   09/30/2016 9 0 - 15 mm/h Final     Erythrocyte Sedimentation Rate   Date Value Ref Range Status   07/13/2023 9 0 - 15 mm/hr Final     CRP Inflammation   Date Value Ref Range Status   02/18/2019 <2.9 0.0 - 8.0 mg/L Final     UA RESULTS:  Recent Labs   Lab Test 04/25/18  1431   COLOR Yellow   APPEARANCE Clear   URINEGLC Negative   URINEBILI Negative   URINEKETONE Negative   SG 1.020   UBLD Negative   URINEPH 6.0   PROTEIN Negative   NITRITE Negative   LEUKEST Negative   RBCU <1   WBCU <1      CRISS by IFA IgG   Date Value Ref Range Status   09/30/2016   Final    <1:40  Reference range: <1:40  (Note)  <1:40  INTERPRETIVE INFORMATION: CRISS by IFA, IgG  Anti-nuclear antibodies (CRISS) are seen in a variety of  systemic rheumatic diseases and are determined by indirect  fluorescence assay (IFA) using HEp-2 substrate with an  IgG-specific conjugate. CRISS titers less than or equal to  1:80 have variable relevance while titers greater than or  equal to 1:160 are considered clinically significant. These  antibodies may precede clinical disease onset; however,  healthy individuals and those with advanced age have been  reported to be positive for CRISS. When observed, one of the  five basic patterns is reported: homogeneous,  peripheral/rim, speckled, centromere, or nucleolar. If  cytoplasmic fluorescence is observed, it is noted. IFA  methodology is subjective  and has occasionally been shown  to lack sensitivity for anti-SSA/Ro antibodies.  Negative results do not necessarily rule out the presence  of SSc. If clinical suspicion remains, consider further  testing for U3-RNP, PM/Scl, or Th/To antibodies associated  with SSc.  Performed by [facility],  [address] [place] [address],[place] [address] [phone]  [email or web address], Earnest Kelly MD, Lab. Director       Rheumatoid Factor   Date Value Ref Range Status   04/25/2018 <20 <20 IU/mL Final     Cyclic Citrullinated Peptide Antibody, IgG   Date Value Ref Range Status   04/25/2018 1 <7 U/mL Final     Comment:     Negative         Again, thank you for allowing me to participate in the care of your patient.      Sincerely,    Vitaliy Corea MD

## 2024-01-26 NOTE — NURSING NOTE
Is the patient currently in the state of [place]? YES    Visit mode:VIDEO    If the visit is dropped, the patient can be reconnected by: VIDEO VISIT: Text to cell phone:   Telephone Information:   Mobile [phone]       Will anyone else be joining the visit? NO  (If patient encounters technical issues they should call [phone]    How would you like to obtain your AVS? MyChart    Are changes needed to the allergy or medication list? No    Reason for visit: RECHECK    Medications and allergies have been reviewed.     Gary SCHWARTZ

## 2024-01-26 NOTE — PATIENT INSTRUCTIONS
Diagnosis:  1.  Psoriatic arthritis: Apart from lingering mild hand and shoulder pain, joint symptoms are controlled; psoriasis is suppressed.  2.  Chronic left shoulder pain: Night and position pain suggest the possibility of rotator cuff or bursa disease.  Recommend x-ray to rule out bony change, and course of physical therapy.    Plan:  1.  X-ray, left shoulder  2.  Repeat blood work, including liver function tests.  3.  Continue adalimumab 40 mg subcu every 21 days.  4.  Physical therapy course for impingement syndrome, left shoulder; injection or more aggressive therapy may be needed if physical therapy does not help in 2 months

## 2024-02-02 ENCOUNTER — TELEPHONE (OUTPATIENT)
Dept: RHEUMATOLOGY | Facility: CLINIC | Age: 44
End: 2024-02-02
Payer: COMMERCIAL

## 2024-02-02 NOTE — TELEPHONE ENCOUNTER
[facility] Call Center    Phone Message    May a detailed message be left on voicemail: yes     Reason for Call: Medication Question or concern regarding medication   Prescription Clarification  Name of Medication:   adalimumab (HUMIRA PEN) 40 MG/0.8ML pen kit    Prescribing Provider: Dr. DI Corea   Pharmacy:   [facility] - [address]      What on the order needs clarification? Change is frequency, patient was getting Humira every 2 weeks now instructions say every 21 days. Please verify accuracy of change with pharmacy thank you      Action Taken: Other: Rheum    Travel Screening: Not Applicable

## 2024-02-02 NOTE — TELEPHONE ENCOUNTER
Message left for patient to call this RN back directly to confirm  the dosing on his Humira.    Lois Cassidy BSN, RN  RN Care Coordinator Rheumatology

## 2024-02-05 NOTE — TELEPHONE ENCOUNTER
Additional  message left for patient to call this RN back directly to confirm if he is using his humira every 2 or every 3 weeks.    Lois Cassidy BSN, RN  RN Care Coordinator Rheumatology

## 2024-02-07 NOTE — TELEPHONE ENCOUNTER
Follow-up call to pharmacy, confirmed every 3 week dosing of humira per direction of Dr. Corea.    All questions answered.    Lois Cassidy, BSN, RN  RN Care Coordinator Rheumatology

## 2024-05-26 ENCOUNTER — HEALTH MAINTENANCE LETTER (OUTPATIENT)
Age: 44
End: 2024-05-26

## 2024-07-01 ENCOUNTER — TELEPHONE (OUTPATIENT)
Dept: RHEUMATOLOGY | Facility: CLINIC | Age: 44
End: 2024-07-01
Payer: COMMERCIAL

## 2024-07-01 NOTE — TELEPHONE ENCOUNTER
Prior Authorization Approval    Medication: HUMIRA (2 PEN) 40 MG/0.8ML SC PEN KIT  Authorization Effective Date: 7/1/2024  Authorization Expiration Date: 7/1/2025  Approved Dose/Quantity: 1 per 21 days  Reference #: [id number]   Insurance Company: [employer] ([facility]) - Phone [phone] Fax [phone]  Expected CoPay: $    CoPay Card Available: Yes    Financial Assistance Needed: Unknown  Which Pharmacy is filling the prescription: [facility] - [address]  Pharmacy Notified: Not needed  Patient Notified: Not needed

## 2024-07-01 NOTE — TELEPHONE ENCOUNTER
PA Initiation    Medication: HUMIRA (2 PEN) 40 MG/0.8ML SC PEN KIT  Insurance Company: [employer]ie ([facility]) - Phone [phone] Fax [phone]  Pharmacy Filling the Rx: [facility] - [address]  Filling Pharmacy Phone:    Filling Pharmacy Fax:    Start Date: 7/1/2024    [id number]

## 2025-01-15 ENCOUNTER — TELEPHONE (OUTPATIENT)
Dept: RHEUMATOLOGY | Facility: CLINIC | Age: 45
End: 2025-01-15
Payer: COMMERCIAL

## 2025-01-15 DIAGNOSIS — L40.50 PSORIATIC ARTHRITIS (H): Primary | ICD-10-CM

## 2025-01-16 RX ORDER — ADALIMUMAB-ADAZ 40 MG/.4ML
40 INJECTION, SOLUTION SUBCUTANEOUS
Qty: 0.8 ML | Refills: 8 | OUTPATIENT
Start: 2025-01-16

## 2025-01-16 NOTE — TELEPHONE ENCOUNTER
Prior Authorization Approval    Medication: HYRIMOZ 40 MG/0.4ML SC SOAJ  Authorization Effective Date: 1/16/2025  Authorization Expiration Date: 1/16/2026  Approved Dose/Quantity: 2 per 28 days  Reference #: [id number]   Insurance Company: [facility] [employer] Prior Auth Dept, phone  [phone], Fax [phone]  Expected CoPay: $    CoPay Card Available:      Financial Assistance Needed: Unknown  [facility] is filling the prescription[facility]  Pharmacy Notified: When new RX is sent  Patient Notified: When new RX is sent

## 2025-02-07 ENCOUNTER — LAB (OUTPATIENT)
Dept: LAB | Facility: CLINIC | Age: 45
End: 2025-02-07
Payer: COMMERCIAL

## 2025-02-07 DIAGNOSIS — D84.9 IMMUNOSUPPRESSION: ICD-10-CM

## 2025-02-07 LAB
ALBUMIN SERPL BCG-MCNC: 4.1 G/DL (ref 3.5–5.2)
ALP SERPL-CCNC: 85 U/L (ref 40–150)
ALT SERPL W P-5'-P-CCNC: 127 U/L (ref 0–70)
ANION GAP SERPL CALCULATED.3IONS-SCNC: 8 MMOL/L (ref 7–15)
AST SERPL W P-5'-P-CCNC: 81 U/L (ref 0–45)
BILIRUB SERPL-MCNC: 1 MG/DL
BUN SERPL-MCNC: 12.2 MG/DL (ref 6–20)
CALCIUM SERPL-MCNC: 9.2 MG/DL (ref 8.8–10.4)
CHLORIDE SERPL-SCNC: 103 MMOL/L (ref 98–107)
CREAT SERPL-MCNC: 1.13 MG/DL (ref 0.67–1.17)
EGFRCR SERPLBLD CKD-EPI 2021: 82 ML/MIN/1.73M2
ERYTHROCYTE [DISTWIDTH] IN BLOOD BY AUTOMATED COUNT: 12.1 % (ref 10–15)
ERYTHROCYTE [SEDIMENTATION RATE] IN BLOOD BY WESTERGREN METHOD: 11 MM/HR (ref 0–15)
GLUCOSE SERPL-MCNC: 157 MG/DL (ref 70–99)
HCO3 SERPL-SCNC: 26 MMOL/L (ref 22–29)
HCT VFR BLD AUTO: 46.7 % (ref 40–53)
HGB BLD-MCNC: 16.2 G/DL (ref 13.3–17.7)
MCH RBC QN AUTO: 32.6 PG (ref 26.5–33)
MCHC RBC AUTO-ENTMCNC: 34.7 G/DL (ref 31.5–36.5)
MCV RBC AUTO: 94 FL (ref 78–100)
PLATELET # BLD AUTO: 198 10E3/UL (ref 150–450)
POTASSIUM SERPL-SCNC: 4.6 MMOL/L (ref 3.4–5.3)
PROT SERPL-MCNC: 7.8 G/DL (ref 6.4–8.3)
RBC # BLD AUTO: 4.97 10E6/UL (ref 4.4–5.9)
SODIUM SERPL-SCNC: 137 MMOL/L (ref 135–145)
WBC # BLD AUTO: 6.3 10E3/UL (ref 4–11)

## 2025-02-07 PROCEDURE — 85027 COMPLETE CBC AUTOMATED: CPT | Performed by: PATHOLOGY

## 2025-02-07 PROCEDURE — 80053 COMPREHEN METABOLIC PANEL: CPT | Performed by: PATHOLOGY

## 2025-02-07 PROCEDURE — 36415 COLL VENOUS BLD VENIPUNCTURE: CPT | Performed by: PATHOLOGY

## 2025-02-07 PROCEDURE — 85652 RBC SED RATE AUTOMATED: CPT | Performed by: PATHOLOGY

## 2025-02-11 ENCOUNTER — TELEPHONE (OUTPATIENT)
Dept: RHEUMATOLOGY | Facility: CLINIC | Age: 45
End: 2025-02-11
Payer: COMMERCIAL

## 2025-02-11 NOTE — TELEPHONE ENCOUNTER
MTM referral from: [facility] visit (referral by provider)    MTM referral outreach attempt #2 on February 11, 2025 at 3:45 PM      Outcome: Patient is not interested at this time because he is not resuming Humira    Use HB for the carrier/Plan on the flowsheet      GERRY Berman

## 2025-06-14 ENCOUNTER — HEALTH MAINTENANCE LETTER (OUTPATIENT)
Age: 45
End: 2025-06-14

## 2025-06-16 ENCOUNTER — TELEPHONE (OUTPATIENT)
Dept: ENDOCRINOLOGY | Facility: CLINIC | Age: 45
End: 2025-06-16

## 2025-06-16 ENCOUNTER — OFFICE VISIT (OUTPATIENT)
Dept: ENDOCRINOLOGY | Facility: CLINIC | Age: 45
End: 2025-06-16
Payer: COMMERCIAL

## 2025-06-16 VITALS
SYSTOLIC BLOOD PRESSURE: 130 MMHG | DIASTOLIC BLOOD PRESSURE: 87 MMHG | WEIGHT: 276.4 LBS | HEART RATE: 61 BPM | HEIGHT: 70 IN | OXYGEN SATURATION: 79 % | BODY MASS INDEX: 39.57 KG/M2

## 2025-06-16 DIAGNOSIS — E66.812 CLASS 2 OBESITY WITHOUT SERIOUS COMORBIDITY WITH BODY MASS INDEX (BMI) OF 36.0 TO 36.9 IN ADULT, UNSPECIFIED OBESITY TYPE: ICD-10-CM

## 2025-06-16 PROCEDURE — 3075F SYST BP GE 130 - 139MM HG: CPT | Performed by: INTERNAL MEDICINE

## 2025-06-16 PROCEDURE — 3079F DIAST BP 80-89 MM HG: CPT | Performed by: INTERNAL MEDICINE

## 2025-06-16 PROCEDURE — 99204 OFFICE O/P NEW MOD 45 MIN: CPT | Performed by: INTERNAL MEDICINE

## 2025-06-16 PROCEDURE — 1125F AMNT PAIN NOTED PAIN PRSNT: CPT | Performed by: INTERNAL MEDICINE

## 2025-06-16 RX ORDER — FEXOFENADINE HCL 180 MG/1
1 TABLET ORAL DAILY
COMMUNITY
Start: 2025-05-28

## 2025-06-16 RX ORDER — BUDESONIDE AND FORMOTEROL FUMARATE DIHYDRATE 160; 4.5 UG/1; UG/1
AEROSOL RESPIRATORY (INHALATION)
COMMUNITY
Start: 2025-05-28

## 2025-06-16 ASSESSMENT — PAIN SCALES - GENERAL: PAINLEVEL_OUTOF10: MODERATE PAIN (5)

## 2025-06-16 NOTE — LETTER
"2025       RE: Leon Samuels  [id number] [place] [address]     Dear Colleague,    Thank you for referring your patient, Leon Samuels, to the [facility]. Please see a copy of my visit note below.        New Medical Weight Management Consult    PATIENT:  Leon Samuels  MRN:         [id number]  :         1980  EVELIO:         2025    Dear Physician No Ref-Primary,    I had the pleasure of seeing your patient, Leon Samuels.  Full intake/assessment done to determine barriers to weight loss success and develop a treatment plan.  Leon Samuels is a 44 year old male interested in treatment of medical problems associated with weight.  His weight today is 276 lbs 6.4 oz, Body mass index is 39.66 kg/m ., and he has the following co-morbidities:      2025     9:25 AM   --   I have the following health issues associated with obesity Sleep Apnea    Asthma   I have the following symptoms associated with obesity Knee Pain    Depression    Back Pain    Fatigue            No data to display                    2025     9:25 AM   Referring Provider   Please name the provider who referred you to Medical Weight Management  If you do not know, please answer \"I Don't Know\" i dont know       Wt Readings from Last 4 Encounters:   25 125.4 kg (276 lb 6.4 oz)   25 126.1 kg (278 lb)   24 117.9 kg (260 lb)   23 125.6 kg (277 lb)           2025     9:25 AM   Weight History   How concerned are you about your weight? Very Concerned   I became overweight As an Adult   The following factors have contributed to my weight gain Started on Medication that Caused Weight Gain    Eating Wrong Types of Food    Lack of Exercise    Stress   My lowest weight since age 18 was 175   My highest weight since age 18 was 275   The most weight I have ever lost was (lbs) 25   I " have the following family history of obesity/being overweight Many of my relatives are overweight   How has your weight changed over the last year? Gained   How many pounds? 15           6/16/2025     9:25 AM   Diet Recall   Glass juice/day 1   Glass milk/day 0   Glass sugary drink/day 0   How many cans/bottles of sugar pop/soda/tea/sports drinks do you drink in a day? 2   Diet drink/day 0   Alcohol 2-3 TImes a Week   Drinks/day 3-4           6/16/2025     9:25 AM   Eating Habits   Generally, my meals include foods like these bread, pasta, rice, potatoes, corn, crackers, sweet dessert, pop, or juice A Few Times a Week   Generally, my meals include foods like these fried meats, brats, burgers, french fries, pizza, cheese, chips, or ice cream A Few Times a Week   Eat fast food (like McDonalds, [employer]se, Taco Bell) Once a Week   Eat at a buffet or sit-down restaurant A Few Times a Week   Eat most of my meals in front of the TV or computer Once a Week   Often skip meals, eat at random times, have no regular eating times Everyday   Rarely sit down for a meal but snack or graze throughout A Few Times a Week   Eat extra snacks between meals Less Than Weekly   Eat in the middle of the night or wake up at night to eat Never   Eat extra snacks to prevent or correct low blood sugar Never   Eat to prevent acid reflux or stomach pain Never   Worry about not having enough food to eat Never   I eat when I am depressed Never   I eat when I am stressed Never   I eat when I am bored Less Than Weekly   I eat when I am anxious Never   I eat when I am happy or as a reward Never   I feel hungry all the time even if I just have eaten Never   Feeling full is important to me Once a Week   I can't resist eating delicious food or walk past the good food/smell Once a Week   I eat/snack without noticing that I am eating Once a Week   I eat when I am preparing the meal Less Than Weekly   I eat more than usual when I see others eating Once a  Week   I have trouble not eating sweets, ice cream, cookies, or chips if they are around the house Less Than Weekly   I think about food all day Never   What foods, if any, do you crave? Chips/Crackers   Please list any other foods you crave? meat           6/16/2025     9:25 AM   Activity/Exercise History   How much of a typical 12 hour day do you spend sitting? Half the Day   How much of a typical 12 hour day do you spend lying down? Less Than Half the Day   How much of a typical day do you spend walking/standing? Half the Day   How many hours (not including work) do you spend on the TV/Video Games/Computer/Tablet/Phone? 2-3 Hours   How many times a week are you active for the purpose of exercise? Once a Week   What keeps you from being more active? 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 Less Than 50%   How many hours do you spend commuting to work daily? 2 but regularly drive throughout the day   What is your marital status? /In a Relationship   If in a relationship, is your significant other overweight? Yes   If you have children, are they overweight? No   Who do you live with? wife and kids   Who does the food shopping? wife           6/16/2025     9:25 AM   Mental Health History Reviewed With Patient   Have you ever been physically or sexually abused? No   How often in the past 2 weeks have you felt little interest or pleasure in doing things? For Several Days   Over the past 2 weeks how often have you felt down, depressed, or hopeless? For Several Days           6/16/2025     9:25 AM   Sleep History Reviewed With Patient   How many hours do you sleep at night? 6.5       MEDICATIONS:    Current Outpatient Medications   Medication Sig Dispense Refill     adalimumab-adaz (HYRIMOZ) 40 MG/0.4ML auto-injector kit Inject 0.4 mLs (40 mg) subcutaneously every 14 days. 0.8 mL 8     albuterol (PROAIR HFA, PROVENTIL HFA, VENTOLIN HFA) 108 (90 BASE) MCG/ACT inhaler Inhale 2 puffs into the lungs every 6 hours. prn       amoxicillin-clavulanate (AUGMENTIN) 875-125 MG tablet TAKE 1 TABLET BY MOUTH EVERY 12 HOURS FOR 7 DAYS WITH FOOD FOR SINUS INFECTION       budesonide-formoterol (SYMBICORT/BREYNA) 160-4.5 MCG/ACT inhaler INHALE 2 PUFFS TWICE A DAY RINSE MOUTH AFTER USE       EPINEPHrine (ANY BX GENERIC EQUIV) 0.3 MG/0.3ML injection 2-pack Inject 0.3 mLs (0.3 mg) into the muscle as needed for anaphylaxis May repeat one time in 5-15 minutes if response to initial dose is inadequate. 2 each 1     erythromycin (ROMYCIN) 5 MG/GM ophthalmic ointment Place into both eyes 2 times daily       fexofenadine (ALLEGRA) 180 MG tablet Take 1 tablet by mouth daily. prn       adalimumab (HUMIRA PEN) 40 MG/0.8ML pen kit Inject 0.8 mLs (40 mg) Subcutaneous every 14 days Hold for signs of infection, then seek medical attention. (Patient not taking: Reported on 6/16/2025) 1.6 mL 11     adalimumab (HUMIRA, 2 PEN,) 40 MG/0.8ML pen kit Inject 0.8 mLs (40 mg) subcutaneously every 14 days. (Patient not taking: Reported on 6/16/2025) 3 each 1     predniSONE (DELTASONE) 20 MG tablet Take 40 mg by mouth daily take 2 tablets by mouth daily for 5 days (Patient not taking: Reported on 6/16/2025)       predniSONE (DELTASONE) 5 MG tablet Take 4 tabs daily for 1 week, then take 3 tabs daily for 1 week (Patient not taking: Reported on 6/16/2025) 49 tablet 1     predniSONE (DELTASONE) 5 MG tablet Take 3 tabs daily for 1 week, then take 2 tabs daily for 1 week (Patient not taking: Reported on 6/16/2025) 35 tablet 2       ALLERGIES:   Allergies   Allergen Reactions     Peanuts [Nuts] Anaphylaxis       PHYSICAL EXAM:  /87 (BP Location: Left  "arm, Patient Position: Sitting, Cuff Size: Adult Large)   Pulse 61   Ht 1.778 m (5' 10\")   Wt 125.4 kg (276 lb 6.4 oz)   SpO2 (!) 79%   BMI 39.66 kg/m     A & O x 3  HEENT: NCAT, mucous membranes moist  Respirations unlabored  Location of obesity: Central Obesity    ASSESSMENT:  Leon is a patient with mature onset class 2 obesity without significant element of familial/genetic influence and with current health consequences. He does need aggressive weight loss plan due to sleep apnea and RA related arthritis pain.      Leon Samuels eats a high carb diet and mostly eats during the evening.    His problem is complicated by strong craving/reward pathways    His ability to lose weight is impacted by lack of confidence and lack of education on nutrition and dietary needs.    PLAN:    Meal planning -focus on no between meal snacking, aggressive lowering of starches and cheese    Craving/Reward   Ancillary testing:  N/A.  Food Plan:  focus on no between meal snacking, aggressive lowering of starches and cheese.   Activity Plan:  Activity journal.  Supplementary:  N/A.   Medication:  The patient will begin medication in pursuit of improved medical status as influenced by body weight. He will start Zepbound.  There is a mutual understanding of the goals and risks of this therapy. The patient is in agreement. He is educated on dosage regimen and possible side effects.    RTC:    12 weeks.  I spent 45 minutes with this patient face to face and explained the conditions and plans (more than 50% of time was counseling/coordination of weight management).    Sincerely,    Danny Bynum MD          Again, thank you for allowing me to participate in the care of your patient.      Sincerely,    Danny Bynum MD    "

## 2025-06-16 NOTE — NURSING NOTE
"(   Chief Complaint   Patient presents with    New Patient     New MWM    )    ( Weight: 276 lb 6.4 oz )  ( Height: 5' 10\" )  ( BMI (Calculated): 39.66 )  (   )  ( Cumulative weight loss (lbs): 0 )  (   )  (   )  ( Waist Circumference (cm): 128 cm )  (   )    ( BP: 130/87 )  (   )  (   )  (   )  ( Pulse: 61 )  (   )  ( SpO2: (!) 79 % )    (   Patient Active Problem List   Diagnosis    DDD (degenerative disc disease), lumbar    Displacement of intervertebral disc of lumbosacral region    Lumbosacral radiculopathy    Obesity    Mild persistent asthma without complication    Psoriatic arthritis (H)    Psoriasis    Immunosuppression    )  (   Current Outpatient Medications   Medication Sig Dispense Refill    adalimumab-adaz (HYRIMOZ) 40 MG/0.4ML auto-injector kit Inject 0.4 mLs (40 mg) subcutaneously every 14 days. 0.8 mL 8    albuterol (PROAIR HFA, PROVENTIL HFA, VENTOLIN HFA) 108 (90 BASE) MCG/ACT inhaler Inhale 2 puffs into the lungs every 6 hours. prn      amoxicillin-clavulanate (AUGMENTIN) 875-125 MG tablet TAKE 1 TABLET BY MOUTH EVERY 12 HOURS FOR 7 DAYS WITH FOOD FOR SINUS INFECTION      budesonide-formoterol (SYMBICORT/BREYNA) 160-4.5 MCG/ACT inhaler INHALE 2 PUFFS TWICE A DAY RINSE MOUTH AFTER USE      EPINEPHrine (ANY BX GENERIC EQUIV) 0.3 MG/0.3ML injection 2-pack Inject 0.3 mLs (0.3 mg) into the muscle as needed for anaphylaxis May repeat one time in 5-15 minutes if response to initial dose is inadequate. 2 each 1    erythromycin (ROMYCIN) 5 MG/GM ophthalmic ointment Place into both eyes 2 times daily      fexofenadine (ALLEGRA) 180 MG tablet Take 1 tablet by mouth daily. prn      adalimumab (HUMIRA PEN) 40 MG/0.8ML pen kit Inject 0.8 mLs (40 mg) Subcutaneous every 14 days Hold for signs of infection, then seek medical attention. (Patient not taking: Reported on 6/16/2025) 1.6 mL 11    adalimumab (HUMIRA, 2 PEN,) 40 MG/0.8ML pen kit Inject 0.8 mLs (40 mg) subcutaneously every 14 days. (Patient not taking: " Reported on 6/16/2025) 3 each 1    predniSONE (DELTASONE) 20 MG tablet Take 40 mg by mouth daily take 2 tablets by mouth daily for 5 days (Patient not taking: Reported on 6/16/2025)      predniSONE (DELTASONE) 5 MG tablet Take 4 tabs daily for 1 week, then take 3 tabs daily for 1 week (Patient not taking: Reported on 6/16/2025) 49 tablet 1    predniSONE (DELTASONE) 5 MG tablet Take 3 tabs daily for 1 week, then take 2 tabs daily for 1 week (Patient not taking: Reported on 6/16/2025) 35 tablet 2    )  ( Diabetes Eval:    )    ( Pain Eval:  Moderate Pain (5) )    ( Wound Eval:       )    (   History   Smoking Status    Never   Smokeless Tobacco    Former    Types: Chew    )    ( Signed By:  Roxana Cooper, NATHEN June 16, 2025; 10:02 AM )

## 2025-06-16 NOTE — TELEPHONE ENCOUNTER
PA Initiation    Medication: ZEPBOUND 2.5 MG/0.5ML SC SOAJ  Insurance Company: [employer] - Phone [phone] Fax [phone]  Pharmacy Filling the Rx:    Filling Pharmacy Phone:    Filling Pharmacy Fax:    Start Date: 6/16/2025     Key [id number]

## 2025-06-16 NOTE — PROGRESS NOTES
"    New Medical Weight Management Consult    PATIENT:  Leon Samuels  MRN:         [id number]  :         1980  EVELIO:         2025    Dear Physician No Ref-Primary,    I had the pleasure of seeing your patient, Leon Samuels.  Full intake/assessment done to determine barriers to weight loss success and develop a treatment plan.  Leon Samuels is a 44 year old male interested in treatment of medical problems associated with weight.  His weight today is 276 lbs 6.4 oz, Body mass index is 39.66 kg/m ., and he has the following co-morbidities:      2025     9:25 AM   --   I have the following health issues associated with obesity Sleep Apnea    Asthma   I have the following symptoms associated with obesity Knee Pain    Depression    Back Pain    Fatigue            No data to display                    2025     9:25 AM   Referring Provider   Please name the provider who referred you to Medical Weight Management  If you do not know, please answer \"I Don't Know\" i dont know       Wt Readings from Last 4 Encounters:   25 125.4 kg (276 lb 6.4 oz)   25 126.1 kg (278 lb)   24 117.9 kg (260 lb)   23 125.6 kg (277 lb)           2025     9:25 AM   Weight History   How concerned are you about your weight? Very Concerned   I became overweight As an Adult   The following factors have contributed to my weight gain Started on Medication that Caused Weight Gain    Eating Wrong Types of Food    Lack of Exercise    Stress   My lowest weight since age 18 was 175   My highest weight since age 18 was 275   The most weight I have ever lost was (lbs) 25   I have the following family history of obesity/being overweight Many of my relatives are overweight   How has your weight changed over the last year? Gained   How many pounds? 15           2025     9:25 AM   Diet Recall   Glass juice/day 1   Glass milk/day 0   Glass sugary drink/day 0   How many cans/bottles of sugar " pop/soda/tea/sports drinks do you drink in a day? 2   Diet drink/day 0   Alcohol 2-3 TImes a Week   Drinks/day 3-4           6/16/2025     9:25 AM   Eating Habits   Generally, my meals include foods like these bread, pasta, rice, potatoes, corn, crackers, sweet dessert, pop, or juice A Few Times a Week   Generally, my meals include foods like these fried meats, brats, burgers, french fries, pizza, cheese, chips, or ice cream A Few Times a Week   Eat fast food (like McDonalds, [employer], Taco Bell) Once a Week   Eat at a buffet or sit-down restaurant A Few Times a Week   Eat most of my meals in front of the TV or computer Once a Week   Often skip meals, eat at random times, have no regular eating times Everyday   Rarely sit down for a meal but snack or graze throughout A Few Times a Week   Eat extra snacks between meals Less Than Weekly   Eat in the middle of the night or wake up at night to eat Never   Eat extra snacks to prevent or correct low blood sugar Never   Eat to prevent acid reflux or stomach pain Never   Worry about not having enough food to eat Never   I eat when I am depressed Never   I eat when I am stressed Never   I eat when I am bored Less Than Weekly   I eat when I am anxious Never   I eat when I am happy or as a reward Never   I feel hungry all the time even if I just have eaten Never   Feeling full is important to me Once a Week   I can't resist eating delicious food or walk past the good food/smell Once a Week   I eat/snack without noticing that I am eating Once a Week   I eat when I am preparing the meal Less Than Weekly   I eat more than usual when I see others eating Once a Week   I have trouble not eating sweets, ice cream, cookies, or chips if they are around the house Less Than Weekly   I think about food all day Never   What foods, if any, do you crave? Chips/Crackers   Please list any other foods you crave? meat           6/16/2025     9:25 AM   Activity/Exercise History   How much of a  typical 12 hour day do you spend sitting? Half the Day   How much of a typical 12 hour day do you spend lying down? Less Than Half the Day   How much of a typical day do you spend walking/standing? Half the Day   How many hours (not including work) do you spend on the TV/Video Games/Computer/Tablet/Phone? 2-3 Hours   How many times a week are you active for the purpose of exercise? Once a Week   What keeps you from being more active? 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 Less Than 50%   How many hours do you spend commuting to work daily? 2 but regularly drive throughout the day   What is your marital status? /In a Relationship   If in a relationship, is your significant other overweight? Yes   If you have children, are they overweight? No   Who do you live with? wife and kids   Who does the food shopping? wife           6/16/2025     9:25 AM   Mental Health History Reviewed With Patient   Have you ever been physically or sexually abused? No   How often in the past 2 weeks have you felt little interest or pleasure in doing things? For Several Days   Over the past 2 weeks how often have you felt down, depressed, or hopeless? For Several Days           6/16/2025     9:25 AM   Sleep History Reviewed With Patient   How many hours do you sleep at night? 6.5       MEDICATIONS:   Current Outpatient Medications   Medication Sig Dispense Refill    adalimumab-adaz (HYRIMOZ) 40 MG/0.4ML auto-injector kit Inject 0.4 mLs (40 mg) subcutaneously every 14 days. 0.8 mL 8    albuterol (PROAIR HFA, PROVENTIL HFA, VENTOLIN HFA) 108 (90 BASE) MCG/ACT inhaler Inhale 2 puffs into the lungs every 6 hours. prn       "amoxicillin-clavulanate (AUGMENTIN) 875-125 MG tablet TAKE 1 TABLET BY MOUTH EVERY 12 HOURS FOR 7 DAYS WITH FOOD FOR SINUS INFECTION      budesonide-formoterol (SYMBICORT/BREYNA) 160-4.5 MCG/ACT inhaler INHALE 2 PUFFS TWICE A DAY RINSE MOUTH AFTER USE      EPINEPHrine (ANY BX GENERIC EQUIV) 0.3 MG/0.3ML injection 2-pack Inject 0.3 mLs (0.3 mg) into the muscle as needed for anaphylaxis May repeat one time in 5-15 minutes if response to initial dose is inadequate. 2 each 1    erythromycin (ROMYCIN) 5 MG/GM ophthalmic ointment Place into both eyes 2 times daily      fexofenadine (ALLEGRA) 180 MG tablet Take 1 tablet by mouth daily. prn      adalimumab (HUMIRA PEN) 40 MG/0.8ML pen kit Inject 0.8 mLs (40 mg) Subcutaneous every 14 days Hold for signs of infection, then seek medical attention. (Patient not taking: Reported on 6/16/2025) 1.6 mL 11    adalimumab (HUMIRA, 2 PEN,) 40 MG/0.8ML pen kit Inject 0.8 mLs (40 mg) subcutaneously every 14 days. (Patient not taking: Reported on 6/16/2025) 3 each 1    predniSONE (DELTASONE) 20 MG tablet Take 40 mg by mouth daily take 2 tablets by mouth daily for 5 days (Patient not taking: Reported on 6/16/2025)      predniSONE (DELTASONE) 5 MG tablet Take 4 tabs daily for 1 week, then take 3 tabs daily for 1 week (Patient not taking: Reported on 6/16/2025) 49 tablet 1    predniSONE (DELTASONE) 5 MG tablet Take 3 tabs daily for 1 week, then take 2 tabs daily for 1 week (Patient not taking: Reported on 6/16/2025) 35 tablet 2       ALLERGIES:   Allergies   Allergen Reactions    Peanuts [Nuts] Anaphylaxis       PHYSICAL EXAM:  /87 (BP Location: Left arm, Patient Position: Sitting, Cuff Size: Adult Large)   Pulse 61   Ht 1.778 m (5' 10\")   Wt 125.4 kg (276 lb 6.4 oz)   SpO2 (!) 79%   BMI 39.66 kg/m     A & O x 3  HEENT: NCAT, mucous membranes moist  Respirations unlabored  Location of obesity: Central Obesity    ASSESSMENT:  Leon is a patient with mature onset class 2 obesity " without significant element of familial/genetic influence and with current health consequences. He does need aggressive weight loss plan due to sleep apnea and RA related arthritis pain.      Leon Samuels eats a high carb diet and mostly eats during the evening.    His problem is complicated by strong craving/reward pathways    His ability to lose weight is impacted by lack of confidence and lack of education on nutrition and dietary needs.    PLAN:    Meal planning -focus on no between meal snacking, aggressive lowering of starches and cheese    Craving/Reward   Ancillary testing:  N/A.  Food Plan:  focus on no between meal snacking, aggressive lowering of starches and cheese.   Activity Plan:  Activity journal.  Supplementary:  N/A.   Medication:  The patient will begin medication in pursuit of improved medical status as influenced by body weight. He will start Zepbound.  There is a mutual understanding of the goals and risks of this therapy. The patient is in agreement. He is educated on dosage regimen and possible side effects.    RTC:    12 weeks.  I spent 45 minutes with this patient face to face and explained the conditions and plans (more than 50% of time was counseling/coordination of weight management).    Sincerely,    Danny Bynum MD

## 2025-06-17 NOTE — TELEPHONE ENCOUNTER
PRIOR AUTHORIZATION DENIED    Medication: ZEPBOUND 2.5 MG/0.5ML SC SOAJ  Insurance Company: [employer] - Phone [phone] Fax [phone]  Denial Date: 6/16/2025  Denial Reason(s):     Appeal Information:     Patient Notified: no

## 2025-06-19 NOTE — TELEPHONE ENCOUNTER
Orders released    Marcie Pretty [facility]  Endo Clinic Liaison  [facility]  [email or web address]   [email or web address]  Phone: [phone]  Fax: [phone]

## 2025-06-19 NOTE — TELEPHONE ENCOUNTER
Do you want me to release for cash pay?    Marcie Pretty [facility]  Endo Clinic Liaison  [facility]  [email or web address]   [email or web address]  Phone: [phone]  Fax: [phone]